# Patient Record
Sex: FEMALE | Race: WHITE | NOT HISPANIC OR LATINO | Employment: FULL TIME | URBAN - METROPOLITAN AREA
[De-identification: names, ages, dates, MRNs, and addresses within clinical notes are randomized per-mention and may not be internally consistent; named-entity substitution may affect disease eponyms.]

---

## 2018-10-03 ENCOUNTER — HISTORICAL (OUTPATIENT)
Dept: ANESTHESIOLOGY | Facility: HOSPITAL | Age: 28
End: 2018-10-03

## 2019-12-19 ENCOUNTER — OFFICE VISIT (OUTPATIENT)
Dept: INTERNAL MEDICINE | Facility: CLINIC | Age: 29
End: 2019-12-19
Payer: COMMERCIAL

## 2019-12-19 VITALS
SYSTOLIC BLOOD PRESSURE: 122 MMHG | WEIGHT: 176.13 LBS | HEIGHT: 64 IN | OXYGEN SATURATION: 99 % | HEART RATE: 109 BPM | BODY MASS INDEX: 30.07 KG/M2 | TEMPERATURE: 98 F | DIASTOLIC BLOOD PRESSURE: 84 MMHG
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
## 2019-12-19 DIAGNOSIS — R00.0 TACHYCARDIA: ICD-10-CM

## 2019-12-19 PROCEDURE — 99999 PR PBB SHADOW E&M-NEW PATIENT-LVL IV: CPT | Mod: PBBFAC,,, | Performed by: FAMILY MEDICINE

## 2019-12-19 PROCEDURE — 3008F BODY MASS INDEX DOCD: CPT | Mod: CPTII,S$GLB,, | Performed by: FAMILY MEDICINE

## 2019-12-19 PROCEDURE — 3008F PR BODY MASS INDEX (BMI) DOCUMENTED: ICD-10-PCS | Mod: CPTII,S$GLB,, | Performed by: FAMILY MEDICINE

## 2019-12-19 PROCEDURE — 99203 OFFICE O/P NEW LOW 30 MIN: CPT | Mod: S$GLB,,, | Performed by: FAMILY MEDICINE

## 2019-12-19 PROCEDURE — 99999 PR PBB SHADOW E&M-NEW PATIENT-LVL IV: ICD-10-PCS | Mod: PBBFAC,,, | Performed by: FAMILY MEDICINE

## 2019-12-19 PROCEDURE — 99203 PR OFFICE/OUTPT VISIT, NEW, LEVL III, 30-44 MIN: ICD-10-PCS | Mod: S$GLB,,, | Performed by: FAMILY MEDICINE

## 2019-12-19 RX ORDER — PHENTERMINE HYDROCHLORIDE 37.5 MG/1
TABLET ORAL
COMMUNITY
Start: 2019-12-09 | End: 2021-11-09

## 2019-12-19 RX ORDER — OMEPRAZOLE 40 MG/1
40 CAPSULE, DELAYED RELEASE ORAL 2 TIMES DAILY
COMMUNITY
Start: 2019-12-09

## 2019-12-19 RX ORDER — GABAPENTIN 300 MG/1
CAPSULE ORAL
COMMUNITY
Start: 2019-11-22 | End: 2021-11-09

## 2019-12-19 RX ORDER — NAPROXEN 500 MG/1
500 TABLET ORAL 2 TIMES DAILY WITH MEALS
Qty: 20 TABLET | Refills: 0 | Status: SHIPPED | OUTPATIENT
Start: 2019-12-19 | End: 2021-11-09

## 2019-12-19 RX ORDER — DULOXETIN HYDROCHLORIDE 20 MG/1
CAPSULE, DELAYED RELEASE ORAL
COMMUNITY
Start: 2019-11-22 | End: 2021-11-09

## 2019-12-19 RX ORDER — MONTELUKAST SODIUM 10 MG/1
10 TABLET ORAL DAILY
COMMUNITY
Start: 2019-12-09

## 2019-12-19 RX ORDER — METHOCARBAMOL 500 MG/1
TABLET, FILM COATED ORAL
COMMUNITY
Start: 2017-06-22 | End: 2021-11-09

## 2019-12-19 RX ORDER — RIZATRIPTAN BENZOATE 10 MG/1
TABLET ORAL
COMMUNITY
Start: 2017-06-22 | End: 2021-11-09

## 2019-12-19 RX ORDER — ONDANSETRON HYDROCHLORIDE 8 MG/1
8 TABLET, FILM COATED ORAL EVERY 8 HOURS PRN
COMMUNITY

## 2019-12-19 RX ORDER — NORETHINDRONE ACETATE AND ETHINYL ESTRADIOL 1MG-20(21)
KIT ORAL
COMMUNITY
Start: 2019-11-22 | End: 2021-11-08

## 2019-12-19 RX ORDER — FLUTICASONE PROPIONATE 50 MCG
2 SPRAY, SUSPENSION (ML) NASAL DAILY PRN
COMMUNITY
Start: 2019-10-29

## 2019-12-19 RX ORDER — SITAGLIPTIN AND METFORMIN HYDROCHLORIDE 1000; 50 MG/1; MG/1
TABLET, FILM COATED ORAL
COMMUNITY
Start: 2019-11-22 | End: 2022-12-07 | Stop reason: SDUPTHER

## 2019-12-19 RX ORDER — BACLOFEN 10 MG/1
10 TABLET ORAL 3 TIMES DAILY
COMMUNITY
End: 2022-11-18
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
## 2019-12-19 NOTE — ASSESSMENT & PLAN NOTE
-likely 2/2 adipex which she has been taking for a few months  -did explain that stimulants can elevated hr, bp etc  -asked her to f/u with prescribing doctor and make him aware
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
## 2021-04-15 ENCOUNTER — HISTORICAL (OUTPATIENT)
Dept: ADMINISTRATIVE | Facility: HOSPITAL | Age: 31
End: 2021-04-15

## 2021-04-15 LAB
ABS NEUT (OLG): 3.52 X10(3)/MCL (ref 2.1–9.2)
ALBUMIN SERPL-MCNC: 3.9 GM/DL (ref 3.5–5)
ALBUMIN/GLOB SERPL: 1.3 RATIO (ref 1.1–2)
ALP SERPL-CCNC: 45 UNIT/L (ref 40–150)
ALT SERPL-CCNC: 20 UNIT/L (ref 0–55)
AST SERPL-CCNC: 16 UNIT/L (ref 5–34)
BASOPHILS # BLD AUTO: 0 X10(3)/MCL (ref 0–0.2)
BASOPHILS NFR BLD AUTO: 0 %
BILIRUB SERPL-MCNC: 0.4 MG/DL
BILIRUBIN DIRECT+TOT PNL SERPL-MCNC: 0.1 MG/DL (ref 0–0.5)
BILIRUBIN DIRECT+TOT PNL SERPL-MCNC: 0.3 MG/DL (ref 0–0.8)
BUN SERPL-MCNC: 10.6 MG/DL (ref 7–18.7)
CALCIUM SERPL-MCNC: 9.3 MG/DL (ref 8.4–10.2)
CHLORIDE SERPL-SCNC: 102 MMOL/L (ref 98–107)
CO2 SERPL-SCNC: 25 MMOL/L (ref 22–29)
CREAT SERPL-MCNC: 0.76 MG/DL (ref 0.55–1.02)
EOSINOPHIL # BLD AUTO: 0.2 X10(3)/MCL (ref 0–0.9)
EOSINOPHIL NFR BLD AUTO: 3 %
ERYTHROCYTE [DISTWIDTH] IN BLOOD BY AUTOMATED COUNT: 12 % (ref 11.5–17)
GLOBULIN SER-MCNC: 3 GM/DL (ref 2.4–3.5)
GLUCOSE SERPL-MCNC: 86 MG/DL (ref 74–100)
HCT VFR BLD AUTO: 36.9 % (ref 37–47)
HGB BLD-MCNC: 12.2 GM/DL (ref 12–16)
LYMPHOCYTES # BLD AUTO: 2.4 X10(3)/MCL (ref 0.6–4.6)
LYMPHOCYTES NFR BLD AUTO: 36 %
MCH RBC QN AUTO: 30.4 PG (ref 27–31)
MCHC RBC AUTO-ENTMCNC: 33.1 GM/DL (ref 33–36)
MCV RBC AUTO: 92 FL (ref 80–94)
MONOCYTES # BLD AUTO: 0.5 X10(3)/MCL (ref 0.1–1.3)
MONOCYTES NFR BLD AUTO: 7 %
NEUTROPHILS # BLD AUTO: 3.52 X10(3)/MCL (ref 2.1–9.2)
NEUTROPHILS NFR BLD AUTO: 53 %
PLATELET # BLD AUTO: 275 X10(3)/MCL (ref 130–400)
PMV BLD AUTO: 9.1 FL (ref 9.4–12.4)
POTASSIUM SERPL-SCNC: 4.3 MMOL/L (ref 3.5–5.1)
PROT SERPL-MCNC: 6.9 GM/DL (ref 6.4–8.3)
RBC # BLD AUTO: 4.01 X10(6)/MCL (ref 4.2–5.4)
SODIUM SERPL-SCNC: 138 MMOL/L (ref 136–145)
T3FREE SERPL-MCNC: 3 PG/ML (ref 1.58–3.91)
T4 SERPL-MCNC: 7.58 UG/DL (ref 4.87–11.72)
TSH SERPL-ACNC: 2.76 UIU/ML (ref 0.35–4.94)
WBC # SPEC AUTO: 6.7 X10(3)/MCL (ref 4.5–11.5)

## 2021-06-30 ENCOUNTER — HISTORICAL (OUTPATIENT)
Dept: ADMINISTRATIVE | Facility: HOSPITAL | Age: 31
End: 2021-06-30

## 2021-07-22 ENCOUNTER — HISTORICAL (OUTPATIENT)
Dept: ADMINISTRATIVE | Facility: HOSPITAL | Age: 31
End: 2021-07-22

## 2021-07-23 ENCOUNTER — HISTORICAL (OUTPATIENT)
Dept: ANESTHESIOLOGY | Facility: HOSPITAL | Age: 31
End: 2021-07-23

## 2021-07-25 LAB — FINAL CULTURE: NORMAL

## 2022-12-07 ENCOUNTER — OFFICE VISIT (OUTPATIENT)
Dept: PRIMARY CARE CLINIC | Facility: CLINIC | Age: 32
End: 2022-12-07
Payer: COMMERCIAL

## 2022-12-07 VITALS
BODY MASS INDEX: 29.9 KG/M2 | SYSTOLIC BLOOD PRESSURE: 120 MMHG | HEART RATE: 108 BPM | WEIGHT: 174.19 LBS | DIASTOLIC BLOOD PRESSURE: 80 MMHG | RESPIRATION RATE: 18 BRPM | OXYGEN SATURATION: 100 %

## 2022-12-07 DIAGNOSIS — E28.2 PCOS (POLYCYSTIC OVARIAN SYNDROME): ICD-10-CM

## 2022-12-07 DIAGNOSIS — E88.819 INSULIN RESISTANCE: ICD-10-CM

## 2022-12-07 DIAGNOSIS — R63.5 WEIGHT GAIN: ICD-10-CM

## 2022-12-07 DIAGNOSIS — E66.9 CLASS 1 OBESITY WITH SERIOUS COMORBIDITY AND BODY MASS INDEX (BMI) OF 30.0 TO 30.9 IN ADULT, UNSPECIFIED OBESITY TYPE: ICD-10-CM

## 2022-12-07 DIAGNOSIS — M79.7 FIBROMYALGIA: ICD-10-CM

## 2022-12-07 DIAGNOSIS — E66.9 CLASS 1 OBESITY WITH BODY MASS INDEX (BMI) OF 30.0 TO 30.9 IN ADULT, UNSPECIFIED OBESITY TYPE, UNSPECIFIED WHETHER SERIOUS COMORBIDITY PRESENT: ICD-10-CM

## 2022-12-07 DIAGNOSIS — K21.9 GASTROESOPHAGEAL REFLUX DISEASE WITHOUT ESOPHAGITIS: ICD-10-CM

## 2022-12-07 DIAGNOSIS — E55.9 VITAMIN D DEFICIENCY: ICD-10-CM

## 2022-12-07 PROBLEM — G44.89 HEADACHE SYNDROME: Status: ACTIVE | Noted: 2021-01-07

## 2022-12-07 PROBLEM — E78.5 HYPERLIPIDEMIA: Status: ACTIVE | Noted: 2022-12-07

## 2022-12-07 PROCEDURE — 3074F PR MOST RECENT SYSTOLIC BLOOD PRESSURE < 130 MM HG: ICD-10-PCS | Mod: CPTII,S$GLB,, | Performed by: FAMILY MEDICINE

## 2022-12-07 PROCEDURE — 3008F PR BODY MASS INDEX (BMI) DOCUMENTED: ICD-10-PCS | Mod: CPTII,S$GLB,, | Performed by: FAMILY MEDICINE

## 2022-12-07 PROCEDURE — 3079F DIAST BP 80-89 MM HG: CPT | Mod: CPTII,S$GLB,, | Performed by: FAMILY MEDICINE

## 2022-12-07 PROCEDURE — 1160F PR REVIEW ALL MEDS BY PRESCRIBER/CLIN PHARMACIST DOCUMENTED: ICD-10-PCS | Mod: CPTII,S$GLB,, | Performed by: FAMILY MEDICINE

## 2022-12-07 PROCEDURE — 1160F RVW MEDS BY RX/DR IN RCRD: CPT | Mod: CPTII,S$GLB,, | Performed by: FAMILY MEDICINE

## 2022-12-07 PROCEDURE — 3074F SYST BP LT 130 MM HG: CPT | Mod: CPTII,S$GLB,, | Performed by: FAMILY MEDICINE

## 2022-12-07 PROCEDURE — 99214 PR OFFICE/OUTPT VISIT, EST, LEVL IV, 30-39 MIN: ICD-10-PCS | Mod: S$GLB,,, | Performed by: FAMILY MEDICINE

## 2022-12-07 PROCEDURE — 1159F MED LIST DOCD IN RCRD: CPT | Mod: CPTII,S$GLB,, | Performed by: FAMILY MEDICINE

## 2022-12-07 PROCEDURE — 1159F PR MEDICATION LIST DOCUMENTED IN MEDICAL RECORD: ICD-10-PCS | Mod: CPTII,S$GLB,, | Performed by: FAMILY MEDICINE

## 2022-12-07 PROCEDURE — 3008F BODY MASS INDEX DOCD: CPT | Mod: CPTII,S$GLB,, | Performed by: FAMILY MEDICINE

## 2022-12-07 PROCEDURE — 99999 PR PBB SHADOW E&M-EST. PATIENT-LVL IV: CPT | Mod: PBBFAC,,, | Performed by: FAMILY MEDICINE

## 2022-12-07 PROCEDURE — 99214 OFFICE O/P EST MOD 30 MIN: CPT | Mod: S$GLB,,, | Performed by: FAMILY MEDICINE

## 2022-12-07 PROCEDURE — 3079F PR MOST RECENT DIASTOLIC BLOOD PRESSURE 80-89 MM HG: ICD-10-PCS | Mod: CPTII,S$GLB,, | Performed by: FAMILY MEDICINE

## 2022-12-07 PROCEDURE — 99999 PR PBB SHADOW E&M-EST. PATIENT-LVL IV: ICD-10-PCS | Mod: PBBFAC,,, | Performed by: FAMILY MEDICINE

## 2022-12-07 RX ORDER — SITAGLIPTIN AND METFORMIN HYDROCHLORIDE 1000; 50 MG/1; MG/1
1 TABLET, FILM COATED ORAL 2 TIMES DAILY WITH MEALS
Qty: 60 TABLET | Refills: 1 | Status: SHIPPED | OUTPATIENT
Start: 2022-12-07 | End: 2023-01-30

## 2022-12-07 NOTE — PROGRESS NOTES
"Subjective:       Patient ID: Rosemarie Good is a 32 y.o. female.  Pmhx, fam hx, soc hx, surg hx, allergies, med list reviewed  Referring MD: Yinka  PCP: none yet  BMI noted 29.9  Diet: variable  Exercise/Activity: increased at work  Sleep: fair  Stressors: work  Anxiety/Depression Screen/PHQ-2: neg  Works for [employer]; 24/36 hr shifts. Has headache  Pt did eat breakfast  Usually does not snack at home; just at work  Works 80 hours/week    Chief Complaint: weight gain,   Pt referred by Dr Honeycutt. Not on ocp; trying to conceive. NO supplements currently. NO insulin resistance testing recently but past pos hx.     Pt was seeing Dr. Schwartz and was on janumet and ozempic--ozempic was helpful but would like to continue janumet. Pt states can no longer see Dr Schwartz.       She has hx of PCOS and insulin resistance.     Pt has noticed sugar cravings in afternoon and evenings.     Pt did try adipex and made her feel bad; made resting heart rate increase.     Hx of migraines.     She eats "whatever I can" for lunch. Tends to be hospital food or drive thru.   Pt is moderately picky eater. Does not love veggies.   Likes corn, beans.    Does not like squash, zucchini, cauliflower.    Eats chips and other processed foods on truck. Drinks propel. Pt has zero sugar version. Also likes powerade zero. Likes sweet tea and Dr Pepper but has transitioned to zero sugar version.   Likes [employer] sweet tea--has not tried half and half. She will feel well throughout the day but then crashes.   No veggies raw. Does not like nuts  Does not like hummus or ranch dip.  Usually does $3 bundle: cheeseburger and small willis  Gets some choices at cafeteria at [employer]: beef roast/carrots/potatoes  Does not eat peanut butter   cooks at home  Likes string cheese  No tuna or boiled eggs  Does not like boiled eggs or tuna  Does not like avocados or guacmole        HPI  Review of Systems   Constitutional:  Negative for activity change, " "appetite change, fatigue and fever.   HENT:  Negative for mouth dryness and goiter.    Eyes:  Negative for visual disturbance.   Respiratory:  Negative for apnea, cough, chest tightness and shortness of breath.    Cardiovascular:  Negative for chest pain, palpitations and leg swelling.   Gastrointestinal:  Negative for abdominal pain, constipation and diarrhea.   Endocrine: Negative for cold intolerance, heat intolerance, polydipsia, polyphagia and polyuria.   Genitourinary:  Negative for frequency and menstrual problem.   Musculoskeletal:  Negative for arthralgias and myalgias.   Integumentary:  Negative for color change and rash.   Psychiatric/Behavioral:  Negative for sleep disturbance. The patient is not nervous/anxious.        Objective:    Waist circ: > 40"  Physical Exam  Vitals and nursing note reviewed.   Constitutional:       General: She is not in acute distress.  HENT:      Head: Normocephalic and atraumatic.      Mouth/Throat:      Pharynx: Oropharynx is clear.   Eyes:      General: No scleral icterus.     Pupils: Pupils are equal, round, and reactive to light.   Neck:      Comments: No TM  Cardiovascular:      Rate and Rhythm: Normal rate and regular rhythm.      Pulses: Normal pulses.      Heart sounds: Normal heart sounds. No murmur heard.    No friction rub. No gallop.   Pulmonary:      Effort: Pulmonary effort is normal. No respiratory distress.      Breath sounds: Normal breath sounds. No wheezing, rhonchi or rales.   Abdominal:      General: Bowel sounds are normal. There is no distension.      Palpations: Abdomen is soft.      Tenderness: There is no abdominal tenderness.   Musculoskeletal:         General: No swelling.      Cervical back: Normal range of motion and neck supple. No tenderness.   Lymphadenopathy:      Cervical: No cervical adenopathy.   Skin:     General: Skin is warm.      Capillary Refill: Capillary refill takes less than 2 seconds.      Findings: No erythema or rash. "   Neurological:      Mental Status: She is alert and oriented to person, place, and time.   Psychiatric:         Mood and Affect: Mood normal.         Behavior: Behavior normal.       Assessment:         ICD-10-CM ICD-9-CM   1. PCOS (polycystic ovarian syndrome)  E28.2 256.4   2. Weight gain  R63.5 783.1   3. Gastroesophageal reflux disease without esophagitis  K21.9 530.81   4. Class 1 obesity with serious comorbidity and body mass index (BMI) of 30.0 to 30.9 in adult, unspecified obesity type  E66.9 278.00    Z68.30 V85.30   5. Fibromyalgia  M79.7 729.1   6. Vitamin D deficiency  E55.9 268.9   7. Class 1 obesity with body mass index (BMI) of 30.0 to 30.9 in adult, unspecified obesity type, unspecified whether serious comorbidity present  E66.9 278.00    Z68.30 V85.30   8. Insulin resistance  E88.81 277.7            Plan:       PCOS (polycystic ovarian syndrome)  -     SITagliptan-metformin (JANUMET) 50-1,000 mg per tablet; Take 1 tablet by mouth 2 (two) times daily with meals.  Dispense: 60 tablet; Refill: 1    Weight gain  Work on insulin stabilization  Try one new veggie: dw pt stabilization    Gastroesophageal reflux disease without esophagitis  Chronic/stable    Class 1 obesity with serious comorbidity and body mass index (BMI) of 30.0 to 30.9 in adult, unspecified obesity type    Fibromyalgia  Chronic/stable    Vitamin D deficiency  Continue vit d supplement  Pt enjoys mushrooms    Class 1 obesity with body mass index (BMI) of 30.0 to 30.9 in adult, unspecified obesity type, unspecified whether serious comorbidity present  -     Ambulatory referral/consult to Internal Medicine    Insulin resistance  -     Comprehensive Metabolic Panel; Future; Expected date: 12/07/2022  -     Hemoglobin A1C; Future; Expected date: 12/07/2022  -     Insulin, Random; Future; Expected date: 12/07/2022  -     Lipid Panel; Future; Expected date: 12/07/2022  -     Ambulatory referral/consult to Internal Medicine  -      SITagliptan-metformin (JANUMET) 50-1,000 mg per tablet; Take 1 tablet by mouth 2 (two) times daily with meals.  Dispense: 60 tablet; Refill: 1      Labs next time    Refill Janumet   Consider lomaira if no help    Work on reduction of sugar sweetened beverages  1/2 and 1/2    [place] and string cheese, edamame, greek yogurt to make ranch dip  Eat fit brice and shopping list   Snack 1-2 days/week

## 2022-12-07 NOTE — PATIENT INSTRUCTIONS
"Goal: < 25 grams added sugar/day    Natural Vitality Calm -- magnesium supplement that may help    Pregnitude--supplement (prenatal): has folic acid and joselin-inositiol          PRODUCE  [] All fresh fruit   [] All fresh vegetables   [] All fresh herbs  [] All herb purees + pastes  [] Pre-spiralized vegetable noodles   [] Steam-In-The-Bag begetables  [] Riced cauliflower  [] Jicama sticks  [] Love Beets  all varieties  [] Wholly Guacamole  all varieties  [] Hummus  all varieties, chickpea + vegetable  [] Tofu Shirataki noodles    [] Tofu  all varieties  [] Tempeh  all varieties    PROTEIN  CHICKEN   [] Boneless, skinless breasts  [] Boneless, skinless thighs  [] Ground chicken breast, at least 93% lean  [] Chicken breast cutlet  [] Aidell's  Chicken Sausage + Chicken Meatballs    TURKEY   [] Turkey breast tenderloin   [] Ground turkey breast, at least 93% lean  [] Mikala Naturals  Turkey Sausage    BEEF  [] Tenderloin  [] Sirloin  [] Top Loin  [] Flank Steak  [] Round Steak  [] Filet  [] Lean ground beef, at least 93% lean + grass-fed preferable    PORK  [] Tenderloin  [] Pork Chop  [] Center Cut  [] Mikala Naturals  No-Sugar W[place]    BISON  [] [place]  90 - 95% lean    SEAFOOD  [] All fresh fish + seafood; locally sourced when possible  [] Smoked salmon    HEAT + EAT ENTREES   [] Thomas's Natural Foods  Chicken, Pork, Beef  [] [place]  "All Natural" Grilled Chicken Breast + Strips, all varieties    SAUCES SPREADS + DIPS  [] Bitchin Sauce  Original, Chipotle, Cilantro [employer]  [] Annia's Kitchen  Tzatziki Yogurt Dip, Babaganoush, Hummus  [] Wholly Guacamole  all varieties  [] Hummus  all varieties  [] [place] Gringo Salsa  all varieties  [] Mrs. Sakina's Salsa  all varieties  [] Stubb's All Natural BBQ Sauce  [] Primal Kitchen  Minor, Ketchup, BBQ Sauce  [] Primal Kitchen Pasta Sauce  Roasted Garlic, Tomato Basil, no-dairy Vodka Sauce  [] Sal & Dalia's  HeartSmart Pasta Sauce    DAIRY/DAIRY " SUBSTITUTES/EGGS  EGGS   [] All eggs  cage-free, pasture-raise preferable  [] Crepini  egg wraps  [] Vital Farms  Pasture-Raised Egg Bites  [] JUST Egg [vegan]     CREAMERS   [] Califia  Better Half, original + vanilla unsweetened  [] NutPods  all varieties    MILK   [] Horizon Organic  all varieties except chocolate  [] Organic Valley  all varieties except chocolate  [] Organic Valley  ultra-filtered, reduced fat milk     PLANT_BASED MILK ALTERNATIVES  [] All unsweetened almond milks  original, vanilla + chocolate  [] Ripple  unsweetened   [] Milkadamia  original +_ vanilla, unsweetened   [] Forager  original + vanilla, unsweetened   [] Silk Organic  soy milk, unsweetened  [] Oatly  unsweetened  [] Califia  regular + protein-fortified oat milk, unsweetened     CHEESES  [] Regular or reduced fat cheeses  [] BelGioso  Fresh Mozzarella Snack Packs, Parmesan Power-full Snack   [] Goat cheese  [] Fresh mozzarella  [] String cheese  all varieties  [] Barbie Cottage Cheese  [] Carli's Cultured Cottage Cheese  [] Nell Life 'Just Like Mozzarella'  plant-based shreds and other varieties  [] Parmela Creamery  plant-based shredded cheese    YOGURT  [] Fage  2% low-fat, plain  [] Siggi's  plain, vanilla  [] Chobani Greek  nonfat + whole milk yogurt, plain   [] Chobani Less Sugar  all flavored varieties   [] Oikos Greek  nonfat, plain  [] Two Good  all varieties   [] Yemeni Provisions  plain  [] [place] Organic  low-fat + nonfat, plain  [] [facility] Farm  goat milk yogurt, plain  [] Kefit  unsweetened, plain  [] Forager  Greek style unsweetened, plain [dairy-free]  [] [place]  unsweetened Greek style, plain [dairy-free]  [] [facility]  almond milk yogurt, vanilla or plain, unsweetened [dairy-free]    FREEZER SECTION  FROZEN VEGGIES  [] All plain frozen veggies + greens [e.g. broccoli, brussels, carrots, okra, mushrooms, zucchini, yellow squash, butternut squash, kale, spinach, gail  greens]  [] Riced veggies [e.g. cauliflower, broccoli, butternut squash]  [] Edamame  all varieties  [] Green Giant  [] Veggie Spirals  [] Marinated Veggies [e.g. eggplant, peppers, zucchini]  [] Simply Steam [place] Sprouts  [] Birds Eye  [] Power Blend Italian Style  lentils, broccoli, kale, zucchini  []  [place] Sprouts & Carrots  [] Oven Roasters Broccoli & Cauliflower  [] [place] Blend  [] Tattooed   [] Green Bean Blend  [] Farmer's Market Ratatouille  [] Butter Balsamic Glazed Vegetables  [] Riced Cauliflower & Quinoa Mediterranean Style  [] Michelle's Good Life  Southern Style Greens [sauteed kale + onion]    FROZEN FRUITS  [] All unsweetened frozen fruits  all varieties  [] Dole Fruits & Veggie Blends  Berries 'n Kale  [] Dole Mix-ins  Triple Berry     FROZEN ENTREES  [] The Good Kitchen meals  all varieties [ e.g. Chili Lime Chicken Over Riced Cauliflower]  [] Premium Paleo  Not Braydon Momma's Meatloaf  [] Primal Kitchen  Chicken Pesto + Steak Fajitas w/ Peppers & Onions  [] Eating Well Frozen Entrees  Butter Chicken Masala, Steak Carne Asada, Creamy Pesto Chicken, Chicken + Wild Rice Stroganoff, Yellow [place]into Chicken, Sun-dried Tomato Chicken, Chicken Lo Mein  [] Realgood Entree Bowls  Taiwanese Inspired Beef Bowl over Riced Cauliflower, Chicken Burrito Bowl   [] Great Karma Coconut [place]  [] Radha's  Tamale José Manuel with Black Beans, Vegetable Lasagna  [] Kashi Mayan [place] Bake  [] Healthy Choice  Simply Steamers Chicken Fried Rice  [] Basil Pesto Chicken & Central African Style Pork Power Bowls  [] Tattooed   Enchilada Bowl  [] Buzz Farms  Spicy Black Bean Burgers    FROZEN PIZZAS  [] Cauli'flour Foods  Cauliflower Pizza Crusts  [] Outer Aisle  Cauliflower Crust  [] Radha's  Veggie Crust Cheese Pizza  [] Quest Pizza     VEGETARIAN PRODUCTS  [] Beyond Meat  ground 'meat' + grilled 'chicken' strips  [] Tofurkey  Original Italian Sausage + Original Tempeh  [] Gardein  Beefless  Ground + Meatless Meatballs  [] Buzz Farms Grillers  Original Burger, Crumbles, Meatballs    ICE CREAMS + FROZEN DESSERTS  [] Halo Top  regular + keto series, pops  [] Rebel  ice cream + dessert sandwiches  [] Enlightened  ice cream + bars  [] Nightfood  ice cream  [] Realgood  ice cream  [] Arctic Zero Fit  frozen pint  [] The Frozen Farmer  sorbets  [] Wholly Rollies  Protein Balls, all varieties  [] Dream Pops  Coconut Latte    FROZEN BREAKFASTS  [] Realgood  Breakfast Sandwiches on Cauliflower Cheesy Bread  [] Rebel  ice cream + dessert sandwiches  [] Enlightened  ice cream + bars  [] Nightfood  ice cream  [] Realgood  ice cream  [] Arctic Zero Fit  frozen pint  [] The Frozen Farmer  sorbets  [] Wholly Rollies  Protein Balls, all varieties  [] Dream Pops  Coconut Latte    BREADS/BUNS/WRAPS  [] Luis Bread: All Types - In Freezer Section   [] Flat Out Light Wraps - All Varieties   [] Flat Out Protein Up Carb Down Flat Bread   [] Kontos Whole Wheat Pocket Divya   [] Darnell LION 100% Whole Wheat Tortillas   [] LaTortilla Factory Tortillas - Smart & Delicious; 50 or 80-calorie   [] Nature's Own 100% Whole Wheat Bread   [] Orowheat Healthful - 100% Whole Wheat Slice Bread and [place] Thins   [] Orowheat Healthful - Whole Wheat Nuts & Grain Bread; Flax & Seed Bread   [] [place] Farm Natural Whole Grain 15 Bread   [] [place] Natural Whole Grain English Muffin - 100% Whole Wheat   [] [place] Very Thin 100% Whole Wheat   [] Marianne Donato 45 Calories and Delightful   [] Jed' 100% Whole Wheat Thin-Sliced Bagels and English Muffins   [] Western Bagel: Perfect 10     GLUTEN FREE  [] Kai's Gluten Free Bread   [] [place] Bakehouse 7-Grain Gluten Free Bread     LEGUME PASTA   [] Explore Asian Organic Black Bean Spaghetti   [] Modern Table   [] Tolerant Foods       NUT BUTTERS & JELLIES    NUT BUTTERS   [] Better'n Chocolate: Coconut Chocolate Peanut Butter Spread   [] Better'n Peanut  Butter - All Types   [] Earth Balance Coconut and Peanut Spread   [] Frank's Nut [place]   [] MaraNatha: All Natural Roasted Cashew Butter - [place] or Creamy   [] MaraNatha: Roasted Peanut Butter   [] Nuts 'N More Peanut [place] - All Flavors   [] PB2 Powder - Original or Chocolate   [] Skippy Natural - Creamy, Super Chunk   [] Smart Balance Peanut Butter - [place] or Creamy   [] Peanut Butter & Company:   [] Smooth , Crunch Time, The Heat Is On, Old Fashioned Smooth, Mighty Nut- Powdered Peanut Butter, Squeeze Pack   [] Smucker's Natural Peanut Butter - [place] or Creamy   [] Sunbutter Nut Butter   [] Wild Friends Protein Peanut Butter/[place] o Butter - Vanilla or Chocolate     JELLIES  o Polaner's All Fruit   o Clearly Organic Best Choice: Strawberry Fruit Spread       SNACKS    BARS  [] Kashi Bars - Chewy or Crunchy; Honey [place] o Flax or Peanut Butter   [] KIND Bars - 5 Grams of Sugar or Less   [] KIND Protein Bars - Strong and KIND   [] Nature Valley Protein Bar - All Varieties   [] Nature Valley Roasted Nut Crunch - [place] Crunch; Peanut Crunch   [] [facility] Simple Nut Bar - Roasted Peanut & Honey   [] [facility] Valley Simple Nut Bar - [place], Cashew & Sea Salt   [] [facility] Nut Milam Bar - Salted Caramel Peanut   [] Think Thin Protein Bars   [] Quest Bars, Power Crunch Bars, Pure Protein Bars     BEEF JERKY - NITRATE FREE   [] Game On   [] Grass Run Farms   [] Krave   [] Ostrim   [] Perky Jerky   [] Primal Strips Meatless Vegan Jerky   [] [place]     CHIPS   [] Beanitos Chips   [] Fruit Crisps - e.g. Brother's-All-Natural, Bare Fruit, Yoga Chips   [] Michaela's Soy Crisps: 1.3 ounce bag   [] Quest Protein Chips   [] Wasa Whole Wheat Crisp Bread     CRACKERS  [] [place]'s Gone Crackers   [] Nabisco Triscuit: Regular and Thin Crisp Crackers   [] Vans Say Cheese Crackers (G-F)     POPCORN/NUTS   [] Pawel Palmer's Smart Pop Popcorn - Single Serving   [] 100-Calorie Pack of Nuts - All Varieties      PROTEIN POWDERS & DRINKS  []  Protein -  Whey Protein Powder   [] Garden of Life Raw Protein Powder   [] Iconic Ready-To-Drink Protein Drink   [] Sen One Protein Powder   [] VegaSport Protein Powder     SALSA/HUMMUS/DIPS   [] Eat Well Embrace Life: Zesty Sriracha Carrot o Hummus   [] Pre-Portioned Guacamole Packs   [] Jesus's   [] Tostitos Restaurant Style Salsa       SOUPS   [] Radha's Organic Soups - Lentil, Vegetable, Split Pea, Low-Sodium     CANNED GOODS   [] 100% Pure Pumpkin   [] BlueRunner Creole Cream-Style Red Beans or Navy Beans   [] Cajun Power Chicken Gumbo Base   [] Chicken of the Sea [place]   [] Rigoberto Fresh Cut Sliced Beets   [] Hormel Breast of Chicken in Water   [] LeSuer Tender Baby Whole Carrots   [] McIljim Tabasco Butner Starter   [] Melbaist: Chunk Lite Tuna in Water, Gourmet Select Pouches   [] StarKist: Yellowfin Tuna Fillets   [] Trappey's: Kidney, Butter, [place], Black Eye, Field, and Black Beans   [] MARQUISE Davis's Turnip Greens or Jose Spinach     CONDIMENTS/ SAUCES/SPREADS/ SPICES  [] Sidney Galeano's Magic Seasonings - Regular or Salt Free   [] Chris Oleary's Sauces - All Flavors   [] Laughing Cow Light - All Flavors   [] Dash Salt-Free Marinade - All Flavors   [] Gabriel & Dalia's: Heart Smart Pasta Sauces   [] Tabasco     SALAD DRESSINGS  [] Mellissa's Naturals: Lite Honey Mustard   [] Garnt's Own: Lighten Up Salad Dressing - All Varieties   [] OPA Greek Yogurt Dressings - Ranch, Blue o Cheese, Caesar, Feta Dill     SWEETENERS  [] Sweet Peletier Sweetener   [] Swerve   [] Truvia     BEVERAGES  [] Coconut Water   [] Crystal Light PURE - All Flavors   [] Honest Tea: Just Green Tea, Unsweetened   [] Kombucha Tea   [] [place]   [] [place] Sisters Bloody [place] Mix   [] Metromint - Zero-Sugar; All Natural Flavored   [] Vinod - Plain or Flavored   [] Debi Perez   [] Steaz - Zero-Sugar, All-Natural, Sparkling Tea   [] Tea Bags: Any Brand - e.g. Den, Yogi, Tazzo,  Celestial   [] V8 100% Vegetable Juice   [] Vitamin Water Zero   [] Water   [] Zevia - Stevia Sweetened Soft Drink     BEER/SELENA/LIQUORS  []Denise's Premier Light 64 Calories   [] Bud Select - 55 Calories   [] [facility] Bloody [place] Mix   [] Longo Genuine Draft - 64 Calories   [] Red or White Wine - All Varieties     CEREALS: HOT/COLD   [] Denise'kailey Saul's Original Cereal  [] Baldev's Mill Oat Bran Hot Cereal - Cracked Wheat, Multi-Grain  [] Kashi GoLean Cereal  [] Kashi GoLean Hot Cereal packets - Vanilla; Honey Cinnamon  [] Oswald's Special K Protein Cereal  [] Gwen's Steel Cut Uzbek Oatmeal  [] Nature's Path Smart Bran  [] Restorationist Instant Oatmeal packet, Original  [] Restorationist Old Fashioned Restorationist Oats  [] Uncle Chris's Whole Wheat & Flaxseed Original Cereal

## 2023-03-03 ENCOUNTER — PATIENT MESSAGE (OUTPATIENT)
Dept: PRIMARY CARE CLINIC | Facility: CLINIC | Age: 33
End: 2023-03-03

## 2023-05-18 DIAGNOSIS — E28.2 PCOS (POLYCYSTIC OVARIAN SYNDROME): ICD-10-CM

## 2023-05-18 DIAGNOSIS — E88.819 INSULIN RESISTANCE: ICD-10-CM

## 2023-05-18 RX ORDER — SITAGLIPTIN AND METFORMIN HYDROCHLORIDE 1000; 50 MG/1; MG/1
TABLET, FILM COATED ORAL
Qty: 60 TABLET | Refills: 0 | Status: SHIPPED | OUTPATIENT
Start: 2023-05-18 | End: 2023-06-19

## 2023-07-15 DIAGNOSIS — E88.819 INSULIN RESISTANCE: ICD-10-CM

## 2023-07-15 DIAGNOSIS — E28.2 PCOS (POLYCYSTIC OVARIAN SYNDROME): ICD-10-CM

## 2023-07-16 NOTE — TELEPHONE ENCOUNTER
Refill Routing Note   Medication(s) are not appropriate for processing by [facility] for the following reason(s):      Responsible provider unclear    [facility] action(s):  Defer Care Due:  None identified            Appointments  past 12m or future 3m with PCP    Date Provider   Last Visit   12/7/2022 Aundrea Biswas MD   Next Visit   Visit date not found Aundrea Biswas MD   ED visits in past 90 days: 0        Note composed:1:33 PM 07/16/2023

## 2023-07-17 ENCOUNTER — PATIENT MESSAGE (OUTPATIENT)
Dept: PRIMARY CARE CLINIC | Facility: CLINIC | Age: 33
End: 2023-07-17
Payer: COMMERCIAL

## 2023-07-17 DIAGNOSIS — E66.9 CLASS 1 OBESITY WITH SERIOUS COMORBIDITY AND BODY MASS INDEX (BMI) OF 30.0 TO 30.9 IN ADULT, UNSPECIFIED OBESITY TYPE: ICD-10-CM

## 2023-07-17 DIAGNOSIS — E28.2 PCOS (POLYCYSTIC OVARIAN SYNDROME): Primary | ICD-10-CM

## 2023-07-17 RX ORDER — SITAGLIPTIN AND METFORMIN HYDROCHLORIDE 1000; 50 MG/1; MG/1
TABLET, FILM COATED ORAL
Qty: 60 TABLET | Refills: 0 | Status: SHIPPED | OUTPATIENT
Start: 2023-07-17 | End: 2023-09-20

## 2023-07-24 ENCOUNTER — PATIENT MESSAGE (OUTPATIENT)
Dept: PRIMARY CARE CLINIC | Facility: CLINIC | Age: 33
End: 2023-07-24
Payer: COMMERCIAL

## 2023-07-24 RX ORDER — METFORMIN HYDROCHLORIDE 500 MG/1
1000 TABLET, EXTENDED RELEASE ORAL 2 TIMES DAILY WITH MEALS
Qty: 60 TABLET | Refills: 2 | Status: SHIPPED | OUTPATIENT
Start: 2023-07-24 | End: 2023-09-20

## 2023-08-11 ENCOUNTER — PATIENT MESSAGE (OUTPATIENT)
Dept: PRIMARY CARE CLINIC | Facility: CLINIC | Age: 33
End: 2023-08-11
Payer: COMMERCIAL

## 2023-08-23 ENCOUNTER — PATIENT MESSAGE (OUTPATIENT)
Dept: PRIMARY CARE CLINIC | Facility: CLINIC | Age: 33
End: 2023-08-23
Payer: COMMERCIAL

## 2023-09-07 LAB
ABO + RH BLD: NORMAL
GLUCOSE, 1 HOUR: 166 MG/DL
HBV SURFACE AG SERPL QL IA: NEGATIVE
HCV AB SERPL QL IA: NEGATIVE
HGB BLD-MCNC: 10.9 G/DL
HIV 1+2 AB+HIV1 P24 AG SERPL QL IA: NEGATIVE

## 2023-09-12 DIAGNOSIS — Z36.9 ENCOUNTER FOR FETAL ULTRASOUND: Primary | ICD-10-CM

## 2023-09-20 ENCOUNTER — PROCEDURE VISIT (OUTPATIENT)
Dept: MATERNAL FETAL MEDICINE | Facility: CLINIC | Age: 33
End: 2023-09-20
Payer: COMMERCIAL

## 2023-09-20 ENCOUNTER — OFFICE VISIT (OUTPATIENT)
Dept: MATERNAL FETAL MEDICINE | Facility: CLINIC | Age: 33
End: 2023-09-20
Payer: COMMERCIAL

## 2023-09-20 VITALS
SYSTOLIC BLOOD PRESSURE: 136 MMHG | RESPIRATION RATE: 18 BRPM | OXYGEN SATURATION: 98 % | HEART RATE: 105 BPM | WEIGHT: 181.31 LBS | BODY MASS INDEX: 30.95 KG/M2 | HEIGHT: 64 IN | DIASTOLIC BLOOD PRESSURE: 81 MMHG

## 2023-09-20 DIAGNOSIS — Z36.9 ENCOUNTER FOR FETAL ULTRASOUND: ICD-10-CM

## 2023-09-20 DIAGNOSIS — E04.1 THYROID NODULE: ICD-10-CM

## 2023-09-20 DIAGNOSIS — M79.7 FIBROMYALGIA: ICD-10-CM

## 2023-09-20 DIAGNOSIS — O24.912 DIABETES MELLITUS AFFECTING PREGNANCY IN SECOND TRIMESTER: ICD-10-CM

## 2023-09-20 DIAGNOSIS — R00.2 PALPITATIONS: ICD-10-CM

## 2023-09-20 PROCEDURE — 99204 OFFICE O/P NEW MOD 45 MIN: CPT | Mod: 25,S$GLB,, | Performed by: OBSTETRICS & GYNECOLOGY

## 2023-09-20 PROCEDURE — 3079F PR MOST RECENT DIASTOLIC BLOOD PRESSURE 80-89 MM HG: ICD-10-PCS | Mod: CPTII,S$GLB,, | Performed by: OBSTETRICS & GYNECOLOGY

## 2023-09-20 PROCEDURE — 76805 PR US, OB 14+WKS, TRANSABD, SINGLE GESTATION: ICD-10-PCS | Mod: S$GLB,,, | Performed by: OBSTETRICS & GYNECOLOGY

## 2023-09-20 PROCEDURE — 1160F PR REVIEW ALL MEDS BY PRESCRIBER/CLIN PHARMACIST DOCUMENTED: ICD-10-PCS | Mod: CPTII,S$GLB,, | Performed by: OBSTETRICS & GYNECOLOGY

## 2023-09-20 PROCEDURE — 76805 OB US >/= 14 WKS SNGL FETUS: CPT | Mod: S$GLB,,, | Performed by: OBSTETRICS & GYNECOLOGY

## 2023-09-20 PROCEDURE — 3079F DIAST BP 80-89 MM HG: CPT | Mod: CPTII,S$GLB,, | Performed by: OBSTETRICS & GYNECOLOGY

## 2023-09-20 PROCEDURE — 3008F PR BODY MASS INDEX (BMI) DOCUMENTED: ICD-10-PCS | Mod: CPTII,S$GLB,, | Performed by: OBSTETRICS & GYNECOLOGY

## 2023-09-20 PROCEDURE — 99204 PR OFFICE/OUTPT VISIT, NEW, LEVL IV, 45-59 MIN: ICD-10-PCS | Mod: 25,S$GLB,, | Performed by: OBSTETRICS & GYNECOLOGY

## 2023-09-20 PROCEDURE — 1159F MED LIST DOCD IN RCRD: CPT | Mod: CPTII,S$GLB,, | Performed by: OBSTETRICS & GYNECOLOGY

## 2023-09-20 PROCEDURE — 3075F PR MOST RECENT SYSTOLIC BLOOD PRESS GE 130-139MM HG: ICD-10-PCS | Mod: CPTII,S$GLB,, | Performed by: OBSTETRICS & GYNECOLOGY

## 2023-09-20 PROCEDURE — 3044F PR MOST RECENT HEMOGLOBIN A1C LEVEL <7.0%: ICD-10-PCS | Mod: CPTII,S$GLB,, | Performed by: OBSTETRICS & GYNECOLOGY

## 2023-09-20 PROCEDURE — 3075F SYST BP GE 130 - 139MM HG: CPT | Mod: CPTII,S$GLB,, | Performed by: OBSTETRICS & GYNECOLOGY

## 2023-09-20 PROCEDURE — 3008F BODY MASS INDEX DOCD: CPT | Mod: CPTII,S$GLB,, | Performed by: OBSTETRICS & GYNECOLOGY

## 2023-09-20 PROCEDURE — 1160F RVW MEDS BY RX/DR IN RCRD: CPT | Mod: CPTII,S$GLB,, | Performed by: OBSTETRICS & GYNECOLOGY

## 2023-09-20 PROCEDURE — 1159F PR MEDICATION LIST DOCUMENTED IN MEDICAL RECORD: ICD-10-PCS | Mod: CPTII,S$GLB,, | Performed by: OBSTETRICS & GYNECOLOGY

## 2023-09-20 PROCEDURE — 3044F HG A1C LEVEL LT 7.0%: CPT | Mod: CPTII,S$GLB,, | Performed by: OBSTETRICS & GYNECOLOGY

## 2023-09-20 RX ORDER — INSULIN DETEMIR 100 [IU]/ML
10 INJECTION, SOLUTION SUBCUTANEOUS NIGHTLY
Qty: 3 ML | Refills: 3 | Status: SHIPPED | OUTPATIENT
Start: 2023-09-20 | End: 2023-11-28 | Stop reason: SDUPTHER

## 2023-09-20 RX ORDER — METFORMIN HYDROCHLORIDE 1000 MG/1
1000 TABLET ORAL 2 TIMES DAILY WITH MEALS
COMMUNITY
End: 2023-11-28 | Stop reason: SDUPTHER

## 2023-09-20 RX ORDER — ASPIRIN 81 MG/1
81 TABLET ORAL DAILY
Qty: 60 TABLET | Refills: 3 | Status: ON HOLD | OUTPATIENT
Start: 2023-09-20 | End: 2024-02-05 | Stop reason: HOSPADM

## 2023-09-20 RX ORDER — PEN NEEDLE, DIABETIC 30 GX3/16"
1 NEEDLE, DISPOSABLE MISCELLANEOUS NIGHTLY
Qty: 30 EACH | Refills: 1 | Status: SHIPPED | OUTPATIENT
Start: 2023-09-20

## 2023-09-20 NOTE — ASSESSMENT & PLAN NOTE
She has a history of fibromyalgia and was taking multiple medications prior to pregnancy. She follows with neuro and rheumatology, per her report. She discontinued Orphenadrine and Cyclobenzaprine at positive UPT.  She is in the process of weaning off of Savella.  She is still currently taking Duloxetine daily with plans to discontinue 1 month prior to delivery per recommendation of primary OB.    We have discussed Duloxetine in pregnancy.

## 2023-09-20 NOTE — ASSESSMENT & PLAN NOTE
She reports a history of heart palpitations and slightly elevated pulse.  She states she had a complete cardiac workup at [facility].  She states the only thing that was noted was a slightly elevated pulse.  She has never needed medication for palpitations or tachycardia.  Since becoming pregnant she reports infrequent episodes.  Her pulse today is 105.  We have discussed medication management options and/or repeat cardiac evaluation should these episodes become frequent.  She states she is doing well without medications at this time.

## 2023-09-20 NOTE — PROGRESS NOTES
MATERNAL-FETAL MEDICINE   CONSULT NOTE    Provider requesting consultation: Dr Hale    SUBJECTIVE:     Ms. Rosemarie Good is a 33 y.o.  female with IUP at 14w4d who is seen in consultation by MFM for evaluation and management of:  Problem   1. Diabetes affecting pregnancy in second trimester   2. Thyroid nodule   3. Palpitations   4. Fibromyalgia     She is being referred for a history of insulin resistance and PCOS.  She is been managed by her PCP with Ozempic and Janumet.  She discontinued Ozempic when she was trying to conceive.  She was switched from Janumet to Metformin 1000 mg twice daily at approximately 10 weeks' gestation.  She presents with a sugar log today.  She is checking her values 3 times daily and is sometimes skipping dinner.  She is not following a diabetic diet, however, reports that she has drastically cut back on sugar consumption.  She is not taking a low-dose aspirin daily.  She is not completed baseline A1c or urine protein assessment.  She has a history of thyroid nodules of which she has undergone an ultrasound.  She states the findings did not meet criteria for biopsy.  She denies ever having abnormal thyroid labs or needing medication.  She gets her thyroid labs evaluated annually with her PCP and it was last checked in January.    She has a history of palpitations and has undergone a complete cardiac workup with [facility].  She reports the only finding was a slightly elevated heart rate that has never required medication.    She has a history of fibromyalgia and was taking multiple medications prior to pregnancy.  She discontinued Orphenadrine and Cyclobenzaprine at positive UPT.  She is in the process of weaning off of Savella.  She is still currently taking Duloxetine daily with plans to discontinue 1 month prior to delivery per recommendation of primary OB.       Medication List with Changes/Refills   New Medications    ASPIRIN (ECOTRIN) 81 MG EC TABLET    Take 1 tablet (81 mg  total) by mouth once daily.   Current Medications    FLUTICASONE PROPIONATE (FLONASE) 50 MCG/ACTUATION NASAL SPRAY    2 sprays once daily.    JANUMET 50-1,000 MG PER TABLET    TAKE ONE BY MOUTH TWICE A DAY WITH MEALS    METFORMIN (GLUCOPHAGE) 1000 MG TABLET    Take 1,000 mg by mouth 2 (two) times daily with meals.    METFORMIN (GLUCOPHAGE-XR) 500 MG ER 24HR TABLET    Take 2 tablets (1,000 mg total) by mouth 2 (two) times daily with meals.    MONTELUKAST (SINGULAIR) 10 MG TABLET        OMEPRAZOLE (PRILOSEC) 40 MG CAPSULE        ONDANSETRON (ZOFRAN) 8 MG TABLET    Take 8 mg by mouth every 8 (eight) hours.    PRENATAL 25/IRON FUM/FOLIC/DHA (PRENATAL-1 ORAL)       Discontinued Medications    LETROZOLE (FEMARA) 2.5 MG TAB    Take 1 tablet (2.5 mg total) by mouth once daily Take on day 3 through 7 of cycle..    LETROZOLE (FEMARA) 2.5 MG TAB    Take 2 tablets (5 mg total) by mouth once daily On day 3-7 of cycle..       Review of patient's allergies indicates:  No Known Allergies    PMH:  Past Medical History:   Diagnosis Date    Acid reflux     Allergy     Dyspareunia     Fibromyalgia     Frequent headaches     Insulin resistance     Migraines     PCOS (polycystic ovarian syndrome)     Sinus problem     TMJ (dislocation of temporomandibular joint)        PObHx:  OB History    Para Term  AB Living   1 0 0 0 0 0   SAB IAB Ectopic Multiple Live Births   0 0 0 0 0      # Outcome Date GA Lbr Abdias/2nd Weight Sex Delivery Anes PTL Lv   1 Current                PSH:  Past Surgical History:   Procedure Laterality Date    BIOPSY OF ADENOIDS      TONSILLECTOMY         Family history:family history includes Asthma in her father; Diabetes in her mother; Hyperlipidemia in her father and mother; Hypertension in her mother.    Social history: reports that she has never smoked. She has never used smokeless tobacco. She reports that she does not currently use alcohol. She reports that she does not use drugs.    Genetic history:  " The patient denies any inherited genetic diseases or birth defects in herself or her partner's personal history or family.    Objective:   /81 (BP Location: Left arm, Patient Position: Sitting, BP Method: Medium (Automatic))   Pulse 105   Resp 18   Ht 5' 4" (1.626 m)   Wt 82.2 kg (181 lb 5.3 oz)   LMP  (LMP Unknown)   SpO2 98%   BMI 31.12 kg/m²     Ultrasound performed. See viewpoint for full ultrasound report.    A churchill living IUP is identified, and the biometry is appropriate for established gestational age.    Early, limited anatomy appears normal. The placenta is posterior.    ASSESSMENT/PLAN:     33 y.o.  female with IUP at 14w4d     1. Diabetes affecting pregnancy in second trimester  She has a history of insulin resistance and PCOS. She was taking Ozempic and Janumet prior to pregnancy. She discontinued Ozempic when she was trying to conceive. She was switched from Janumet to Metformin 1000mg BID at approx 10 weeks EGA.     The patient was counseled regarding the risks of diabetes in pregnancy. These risks include but are not limited to an increased risk of , preeclampsia/gestational hypertension, fetal structural anomalies, macrosomia, prematurity, shoulder dystocia/birth injury,  hyperbilirubinemia and electrolyte issues, pulmonary immaturity and sudden stillbirth especially in those patients with poor glucose control. I also discussed with the patient the need for increased fetal surveillance with serial fetal growth assessments and third trimester fetal testing. I also reviewed the possibility of need for early delivery in those with poor glucose control or evidence of fetal growth abnormalities.    She presents with a sugar log today which demonstrates elevated fasting values. She is checking her sugars three times daily. She has some postprandial values out of range. She is not following a diabetic diet, however, does report trying to cut back on sugar intake. We " have provided info for diabetic diet as well as log sheets today.     Recommendations:  Baseline evaluation (to be ordered by primary OB provider):  24 hour urine protein or urine P/C ratio, CBC, CMP (order provided today)  Maternal EKG; echocardiogram if BMI > 30 or EKG is abnormal  Maternal ophthalmic exam (if hasn't been performed in last year) and podiatry exam  Hemoglobin A1C every trimester (order provided today)  Diabetic education referral and counseling re: goal blood sugars (< 95 mg/dl for fasting, < 120 mg/dl for 2 hour postprandial)  Medications:   Start low dose aspirin 81 mg daily at 12-16 weeks for preeclampsia risk reduction  1 mg folic acid daily  Regimen: Metformin 1000mg BID; add Lantus 10u QHS  She will submit her log to our office on a weekly basis via email or portal for review and management   Ultrasounds with MFM:  Targeted anatomy survey at 20 weeks (scheduled with M)  Serial growth ultrasounds q 4-6 weeks at 26-28 weeks (scheduled with [facility])    A fetal echocardiogram is recommended at 22-24 weeks with pediatric cardiology (we will arrange)    Initiate  surveillance at 32 weeks:   Weekly BPP or NST + AFV if good control.   If control is poor, twice weekly  fetal surveillance is recommended with BPP alternating with NST.   Delivery timin 0/7 to 38 and 6/7 weeks if under good control without comorbidities  37 0/7 to 37 and 67 weeks if longstanding diabetes or poorly controlled, polyhydramnios, EFW>90th percentile, or BMI >= 40  36 0/7 to 37 and 6/7 weeks if vascular complications, prior stillbirth or other complicating conditions.  Recommend consideration of earlier delivery if IUGR, HTN, or other complications  Recommend offering  for delivery is EFW is 4500g or more near the time of delivery    Insulin management during labor and delivery (if needed)  -IF AM ADMIT: Give usual dose of intermediate acting (NPH) or long-acting insulin at bedtime the night before  admit  -Hold morning dose of insulin or reduce to ½ dose   -Patients who require insulin should be scheduled as the first available (7 am or 7:30 am)  given the need for NPO status  -Check glucose every 2 hours in latent labor; hourly in active labor  -If blood glucose is less than 70 mg/dl, change IVF to D5 ½ NS at rate of 100-150 cc/hr and monitor blood glucose hourly   -IV infusion of regular insulin is recommended if glucose levels exceed 120 mg/dl  -Patients who are well-controlled with an insulin pump can continue during labor and delivery. Recommend primary OB ensure pump site is out of operative field and confirm pump is compatible with and able to be left on during surgery.    Postpartum management  -Insulin should be reduced by 1/2 of the pre-delivery dose within the first 6-24 hours following delivery.  -Patients who were well-controlled on oral regimens can often return to these following delivery.  -Patients who are breastfeeding should be advised to have small snacks before breastfeeding to reduce the risk of hypoglycemia.  -Patients should be referred to primary MD or Endocrinology for postpartum management.    The patient was advised to call for blood sugars less than 70 or greater than 200 prior to her next appointment. She was also advised that if she notes nausea/vomiting, inability to tolerate PO, or concerns about being able to take her insulin that she should contact her provider or present to the OB ED.        2. Thyroid nodule  She reports a history of thyroid nodules.  She reports having a thyroid ultrasound performed in the past and denies FNA. She also denies abnormal TFTs or need for medication. She gets her thyroid labs checked annually with her PCP and it was last evaluated in January of this year.  We have provided an order for TFTs at today's appointment.    3. Palpitations  She reports a history of heart palpitations and slightly elevated pulse.  She states she had a  complete cardiac workup at [facility].  She states the only thing that was noted was a slightly elevated pulse.  She has never needed medication for palpitations or tachycardia.  Since becoming pregnant she reports infrequent episodes.  Her pulse today is 105.  We have discussed medication management options and/or repeat cardiac evaluation should these episodes become frequent.  She states she is doing well without medications at this time.    4. Fibromyalgia  She has a history of fibromyalgia and was taking multiple medications prior to pregnancy. She follows with neuro and rheumatology, per her report. She discontinued Orphenadrine and Cyclobenzaprine at positive UPT.  She is in the process of weaning off of Savella.  She is still currently taking Duloxetine daily with plans to discontinue 1 month prior to delivery per recommendation of primary OB.    We have discussed Duloxetine in pregnancy.      FOLLOW UP:   F/u in 4-5 weeks for US/MFM visit      This consultation was completed with the assistance of Pati Baker NP.      Trinity Marcano MD  Maternal Fetal Medicine

## 2023-09-20 NOTE — ASSESSMENT & PLAN NOTE
She has a history of insulin resistance and PCOS. She was taking Ozempic and Janumet prior to pregnancy. She discontinued Ozempic when she was trying to conceive. She was switched from Janumet to Metformin 1000mg BID at approx 10 weeks EGA.     The patient was counseled regarding the risks of diabetes in pregnancy. These risks include but are not limited to an increased risk of , preeclampsia/gestational hypertension, fetal structural anomalies, macrosomia, prematurity, shoulder dystocia/birth injury,  hyperbilirubinemia and electrolyte issues, pulmonary immaturity and sudden stillbirth especially in those patients with poor glucose control. I also discussed with the patient the need for increased fetal surveillance with serial fetal growth assessments and third trimester fetal testing. I also reviewed the possibility of need for early delivery in those with poor glucose control or evidence of fetal growth abnormalities.    She presents with a sugar log today which demonstrates elevated fasting values. She is checking her sugars three times daily. She has some postprandial values out of range. She is not following a diabetic diet, however, does report trying to cut back on sugar intake. We have provided info for diabetic diet as well as log sheets today.     Recommendations:  Baseline evaluation (to be ordered by primary OB provider):   24 hour urine protein or urine P/C ratio, CBC, CMP (order provided today)   Maternal EKG; echocardiogram if BMI > 30 or EKG is abnormal   Maternal ophthalmic exam (if hasn't been performed in last year) and podiatry exam   Hemoglobin A1C every trimester (order provided today)   Diabetic education referral and counseling re: goal blood sugars (< 95 mg/dl for fasting, < 120 mg/dl for 2 hour postprandial)  Medications:    Start low dose aspirin 81 mg daily at 12-16 weeks for preeclampsia risk reduction   1 mg folic acid daily   Regimen: Metformin 1000mg BID; add  Lantus 10u QHS   She will submit her log to our office on a weekly basis via email or portal for review and management   Ultrasounds with [facility]:   Targeted anatomy survey at 20 weeks (scheduled with [facility])   Serial growth ultrasounds q 4-6 weeks at 26-28 weeks (scheduled with [facility])    A fetal echocardiogram is recommended at 22-24 weeks with pediatric cardiology (we will arrange)    Initiate  surveillance at 32 weeks:    Weekly BPP or NST + AFV if good control.    If control is poor, twice weekly  fetal surveillance is recommended with BPP alternating with NST.   Delivery timing:   38 0/7 to 38 and 6/7 weeks if under good control without comorbidities   37 0/7 to 37 and 67 weeks if longstanding diabetes or poorly controlled, polyhydramnios, EFW>90th percentile, or BMI >= 40   36 0/7 to 37 and 6/7 weeks if vascular complications, prior stillbirth or other complicating conditions.  Recommend consideration of earlier delivery if IUGR, HTN, or other complications  Recommend offering  for delivery is EFW is 4500g or more near the time of delivery    Insulin management during labor and delivery (if needed)  -IF AM ADMIT: Give usual dose of intermediate acting (NPH) or long-acting insulin at bedtime the night before admit  -Hold morning dose of insulin or reduce to ½ dose   -Patients who require insulin should be scheduled as the first available (7 am or 7:30 am)  given the need for NPO status  -Check glucose every 2 hours in latent labor; hourly in active labor  -If blood glucose is less than 70 mg/dl, change IVF to D5 ½ NS at rate of 100-150 cc/hr and monitor blood glucose hourly   -IV infusion of regular insulin is recommended if glucose levels exceed 120 mg/dl  -Patients who are well-controlled with an insulin pump can continue during labor and delivery. Recommend primary OB ensure pump site is out of operative field and confirm pump is compatible with and able to be left on during  surgery.    Postpartum management  -Insulin should be reduced by 1/2 of the pre-delivery dose within the first 6-24 hours following delivery.  -Patients who were well-controlled on oral regimens can often return to these following delivery.  -Patients who are breastfeeding should be advised to have small snacks before breastfeeding to reduce the risk of hypoglycemia.  -Patients should be referred to primary MD or Endocrinology for postpartum management.    The patient was advised to call for blood sugars less than 70 or greater than 200 prior to her next appointment. She was also advised that if she notes nausea/vomiting, inability to tolerate PO, or concerns about being able to take her insulin that she should contact her provider or present to the OB ED.

## 2023-09-20 NOTE — ASSESSMENT & PLAN NOTE
She reports a history of thyroid nodules.  She reports having a thyroid ultrasound performed in the past and denies FNA. She also denies abnormal TFTs or need for medication. She gets her thyroid labs checked annually with her PCP and it was last evaluated in January of this year.  We have provided an order for TFTs at today's appointment.

## 2023-09-22 ENCOUNTER — LAB VISIT (OUTPATIENT)
Dept: LAB | Facility: HOSPITAL | Age: 33
End: 2023-09-22
Attending: NURSE PRACTITIONER
Payer: COMMERCIAL

## 2023-09-22 DIAGNOSIS — R00.2 PALPITATIONS: ICD-10-CM

## 2023-09-22 DIAGNOSIS — O24.912 DIABETES MELLITUS AFFECTING PREGNANCY IN SECOND TRIMESTER: ICD-10-CM

## 2023-09-22 DIAGNOSIS — E04.1 THYROID NODULE: Primary | ICD-10-CM

## 2023-09-22 LAB
ALBUMIN SERPL-MCNC: 3.5 G/DL (ref 3.5–5)
ALBUMIN/GLOB SERPL: 1.2 RATIO (ref 1.1–2)
ALP SERPL-CCNC: 48 UNIT/L (ref 40–150)
ALT SERPL-CCNC: 10 UNIT/L (ref 0–55)
AST SERPL-CCNC: 10 UNIT/L (ref 5–34)
BILIRUB SERPL-MCNC: 0.2 MG/DL
BUN SERPL-MCNC: 7.8 MG/DL (ref 7–18.7)
CALCIUM SERPL-MCNC: 9.4 MG/DL (ref 8.4–10.2)
CHLORIDE SERPL-SCNC: 105 MMOL/L (ref 98–107)
CO2 SERPL-SCNC: 21 MMOL/L (ref 22–29)
CREAT SERPL-MCNC: 0.63 MG/DL (ref 0.55–1.02)
CREAT UR-MCNC: 84.6 MG/DL (ref 45–106)
EST. AVERAGE GLUCOSE BLD GHB EST-MCNC: 102.5 MG/DL
GFR SERPLBLD CREATININE-BSD FMLA CKD-EPI: >60 MLS/MIN/1.73/M2
GLOBULIN SER-MCNC: 2.9 GM/DL (ref 2.4–3.5)
GLUCOSE SERPL-MCNC: 92 MG/DL (ref 74–100)
HBA1C MFR BLD: 5.2 %
POTASSIUM SERPL-SCNC: 3.9 MMOL/L (ref 3.5–5.1)
PROT SERPL-MCNC: 6.4 GM/DL (ref 6.4–8.3)
PROT UR STRIP-MCNC: <6.8 MG/DL
SODIUM SERPL-SCNC: 136 MMOL/L (ref 136–145)
T4 FREE SERPL-MCNC: 0.76 NG/DL (ref 0.7–1.48)
TSH SERPL-ACNC: 2.61 UIU/ML (ref 0.35–4.94)

## 2023-09-22 PROCEDURE — 82570 ASSAY OF URINE CREATININE: CPT

## 2023-09-22 PROCEDURE — 84443 ASSAY THYROID STIM HORMONE: CPT

## 2023-09-22 PROCEDURE — 83036 HEMOGLOBIN GLYCOSYLATED A1C: CPT

## 2023-09-22 PROCEDURE — 84439 ASSAY OF FREE THYROXINE: CPT

## 2023-09-22 PROCEDURE — 80053 COMPREHEN METABOLIC PANEL: CPT

## 2023-09-22 PROCEDURE — 36415 COLL VENOUS BLD VENIPUNCTURE: CPT

## 2023-09-25 ENCOUNTER — PATIENT MESSAGE (OUTPATIENT)
Dept: MATERNAL FETAL MEDICINE | Facility: CLINIC | Age: 33
End: 2023-09-25
Payer: COMMERCIAL

## 2023-09-29 DIAGNOSIS — O24.112 PRE-EXISTING TYPE 2 DIABETES MELLITUS DURING PREGNANCY IN SECOND TRIMESTER: Primary | ICD-10-CM

## 2023-09-29 RX ORDER — INSULIN PUMP SYRINGE, 3 ML
EACH MISCELLANEOUS
Qty: 1 EACH | Refills: 0 | Status: SHIPPED | OUTPATIENT
Start: 2023-09-29 | End: 2023-10-31 | Stop reason: SDUPTHER

## 2023-09-29 RX ORDER — LANCETS
EACH MISCELLANEOUS
Qty: 100 EACH | Refills: 4 | Status: SHIPPED | OUTPATIENT
Start: 2023-09-29

## 2023-10-03 LAB
C TRACH RRNA SPEC QL PROBE: NEGATIVE
N GONORRHOEAE, AMPLIFIED DNA: NEGATIVE

## 2023-10-23 ENCOUNTER — TELEPHONE (OUTPATIENT)
Dept: MATERNAL FETAL MEDICINE | Facility: CLINIC | Age: 33
End: 2023-10-23
Payer: COMMERCIAL

## 2023-10-23 NOTE — TELEPHONE ENCOUNTER
We received pt's BS log, it was reviewed by Pati, she reports log looks good no changes need to be made, send another log in 1wk.    I called pt informed pt of rec. Per Pati, pt verbalized understanding.

## 2023-10-26 DIAGNOSIS — O24.112 PRE-EXISTING TYPE 2 DIABETES MELLITUS DURING PREGNANCY IN SECOND TRIMESTER: Primary | ICD-10-CM

## 2023-10-26 DIAGNOSIS — E04.1 THYROID NODULE: ICD-10-CM

## 2023-10-26 DIAGNOSIS — O24.912 DIABETES MELLITUS AFFECTING PREGNANCY IN SECOND TRIMESTER: ICD-10-CM

## 2023-10-30 ENCOUNTER — TELEPHONE (OUTPATIENT)
Dept: MATERNAL FETAL MEDICINE | Facility: CLINIC | Age: 33
End: 2023-10-30
Payer: COMMERCIAL

## 2023-10-30 NOTE — TELEPHONE ENCOUNTER
I called pt regarding her BS log and rec. From [place], Metformin 1000 mg BID & Levemir 14u QHS will stay the same, we will need to add Levemir 10u qam w/ breakfast. Pt verbalized understanding, no refill needed at this time, she will send another long in 1 wk.

## 2023-10-31 ENCOUNTER — PROCEDURE VISIT (OUTPATIENT)
Dept: MATERNAL FETAL MEDICINE | Facility: CLINIC | Age: 33
End: 2023-10-31
Payer: COMMERCIAL

## 2023-10-31 ENCOUNTER — OFFICE VISIT (OUTPATIENT)
Dept: MATERNAL FETAL MEDICINE | Facility: CLINIC | Age: 33
End: 2023-10-31
Payer: COMMERCIAL

## 2023-10-31 VITALS
WEIGHT: 188.69 LBS | HEART RATE: 94 BPM | BODY MASS INDEX: 32.39 KG/M2 | SYSTOLIC BLOOD PRESSURE: 119 MMHG | DIASTOLIC BLOOD PRESSURE: 78 MMHG

## 2023-10-31 DIAGNOSIS — E04.1 THYROID NODULE: ICD-10-CM

## 2023-10-31 DIAGNOSIS — O24.912 DIABETES MELLITUS AFFECTING PREGNANCY IN SECOND TRIMESTER: ICD-10-CM

## 2023-10-31 DIAGNOSIS — R00.2 PALPITATIONS: ICD-10-CM

## 2023-10-31 DIAGNOSIS — M79.7 FIBROMYALGIA: ICD-10-CM

## 2023-10-31 DIAGNOSIS — O24.112 PRE-EXISTING TYPE 2 DIABETES MELLITUS DURING PREGNANCY IN SECOND TRIMESTER: ICD-10-CM

## 2023-10-31 PROCEDURE — 3078F PR MOST RECENT DIASTOLIC BLOOD PRESSURE < 80 MM HG: ICD-10-PCS | Mod: CPTII,S$GLB,, | Performed by: OBSTETRICS & GYNECOLOGY

## 2023-10-31 PROCEDURE — 76811 OB US DETAILED SNGL FETUS: CPT | Mod: S$GLB,,, | Performed by: OBSTETRICS & GYNECOLOGY

## 2023-10-31 PROCEDURE — 99214 OFFICE O/P EST MOD 30 MIN: CPT | Mod: S$GLB,,, | Performed by: OBSTETRICS & GYNECOLOGY

## 2023-10-31 PROCEDURE — 3078F DIAST BP <80 MM HG: CPT | Mod: CPTII,S$GLB,, | Performed by: OBSTETRICS & GYNECOLOGY

## 2023-10-31 PROCEDURE — 1160F PR REVIEW ALL MEDS BY PRESCRIBER/CLIN PHARMACIST DOCUMENTED: ICD-10-PCS | Mod: CPTII,S$GLB,, | Performed by: OBSTETRICS & GYNECOLOGY

## 2023-10-31 PROCEDURE — 3074F SYST BP LT 130 MM HG: CPT | Mod: CPTII,S$GLB,, | Performed by: OBSTETRICS & GYNECOLOGY

## 2023-10-31 PROCEDURE — 1159F MED LIST DOCD IN RCRD: CPT | Mod: CPTII,S$GLB,, | Performed by: OBSTETRICS & GYNECOLOGY

## 2023-10-31 PROCEDURE — 1159F PR MEDICATION LIST DOCUMENTED IN MEDICAL RECORD: ICD-10-PCS | Mod: CPTII,S$GLB,, | Performed by: OBSTETRICS & GYNECOLOGY

## 2023-10-31 PROCEDURE — 1160F RVW MEDS BY RX/DR IN RCRD: CPT | Mod: CPTII,S$GLB,, | Performed by: OBSTETRICS & GYNECOLOGY

## 2023-10-31 PROCEDURE — 76811 PR US, OB FETAL EVAL & EXAM, TRANSABDOM,FIRST GESTATION: ICD-10-PCS | Mod: S$GLB,,, | Performed by: OBSTETRICS & GYNECOLOGY

## 2023-10-31 PROCEDURE — 3008F BODY MASS INDEX DOCD: CPT | Mod: CPTII,S$GLB,, | Performed by: OBSTETRICS & GYNECOLOGY

## 2023-10-31 PROCEDURE — 3074F PR MOST RECENT SYSTOLIC BLOOD PRESSURE < 130 MM HG: ICD-10-PCS | Mod: CPTII,S$GLB,, | Performed by: OBSTETRICS & GYNECOLOGY

## 2023-10-31 PROCEDURE — 3044F HG A1C LEVEL LT 7.0%: CPT | Mod: CPTII,S$GLB,, | Performed by: OBSTETRICS & GYNECOLOGY

## 2023-10-31 PROCEDURE — 99214 PR OFFICE/OUTPT VISIT, EST, LEVL IV, 30-39 MIN: ICD-10-PCS | Mod: S$GLB,,, | Performed by: OBSTETRICS & GYNECOLOGY

## 2023-10-31 PROCEDURE — 3044F PR MOST RECENT HEMOGLOBIN A1C LEVEL <7.0%: ICD-10-PCS | Mod: CPTII,S$GLB,, | Performed by: OBSTETRICS & GYNECOLOGY

## 2023-10-31 PROCEDURE — 3008F PR BODY MASS INDEX (BMI) DOCUMENTED: ICD-10-PCS | Mod: CPTII,S$GLB,, | Performed by: OBSTETRICS & GYNECOLOGY

## 2023-10-31 RX ORDER — BLOOD-GLUCOSE METER
EACH MISCELLANEOUS
COMMUNITY
Start: 2023-09-29

## 2023-10-31 RX ORDER — DULOXETIN HYDROCHLORIDE 20 MG/1
20 CAPSULE, DELAYED RELEASE ORAL DAILY
COMMUNITY
Start: 2023-10-10

## 2023-10-31 NOTE — ASSESSMENT & PLAN NOTE
She has a history of insulin resistance and PCOS. She was taking Ozempic and Janumet prior to pregnancy. She discontinued Ozempic when she was trying to conceive. She was switched from Janumet to Metformin 1000mg BID at approx 10 weeks EGA.     We have reviewed the risks of diabetes in pregnancy. These risks include but are not limited to an increased risk of , preeclampsia/gestational hypertension, fetal structural anomalies, macrosomia, prematurity, shoulder dystocia/birth injury,  hyperbilirubinemia and electrolyte issues, pulmonary immaturity and sudden stillbirth especially in those patients with poor glucose control. I also discussed with the patient the need for increased fetal surveillance with serial fetal growth assessments and third trimester fetal testing. I also reviewed the possibility of need for early delivery in those with poor glucose control or evidence of fetal growth abnormalities.    She is currently taking Metformin 1000mg BID, Levemir 10u QAM and 14u QHS. She has been submitting her log routinely to our office for review. Changes were made to her regimen yesterday, therefore, no changes were made today.    Recommendations:  Baseline evaluation (to be ordered by primary OB provider):   24 hour urine protein or urine P/C ratio, CBC, CMP (completed)   Maternal EKG; echocardiogram if BMI > 30 or EKG is abnormal   Maternal ophthalmic exam (if hasn't been performed in last year) and podiatry exam   Hemoglobin A1C every trimester (completed )   Diabetic education referral and counseling re: goal blood sugars (< 95 mg/dl for fasting, < 120 mg/dl for 2 hour postprandial)  Medications:    Start low dose aspirin 81 mg daily at 12-16 weeks for preeclampsia risk reduction   1 mg folic acid daily   Regimen: Metformin 1000mg BID; Levemir 10u QAM/14u QHS   She will submit her log to our office on a weekly basis via email or portal for review and management   Ultrasounds with  MFM:   Targeted anatomy survey at 20 weeks (started today, some views limited)   Serial growth ultrasounds q 4-6 weeks at 26-28 weeks (scheduled w/ MFM)    A fetal echocardiogram is recommended at 22-24 weeks with pediatric cardiology (scheduled 23)    Initiate  surveillance at 32 weeks:    Weekly BPP or NST + AFV if good control.    If control is poor, twice weekly  fetal surveillance is recommended with BPP alternating with NST.   Delivery timing:   38 0/7 to 38 and 6/7 weeks if under good control without comorbidities   37 0/7 to 37 and 67 weeks if longstanding diabetes or poorly controlled, polyhydramnios, EFW>90th percentile, or BMI >= 40   36 0/7 to 37 and 6/7 weeks if vascular complications, prior stillbirth or other complicating conditions.  Recommend consideration of earlier delivery if IUGR, HTN, or other complications  Recommend offering  for delivery is EFW is 4500g or more near the time of delivery    Insulin management during labor and delivery (if needed)  -IF AM ADMIT: Give usual dose of intermediate acting (NPH) or long-acting insulin at bedtime the night before admit  -Hold morning dose of insulin or reduce to ½ dose   -Patients who require insulin should be scheduled as the first available (7 am or 7:30 am)  given the need for NPO status  -Check glucose every 2 hours in latent labor; hourly in active labor  -If blood glucose is less than 70 mg/dl, change IVF to D5 ½ NS at rate of 100-150 cc/hr and monitor blood glucose hourly   -IV infusion of regular insulin is recommended if glucose levels exceed 120 mg/dl  -Patients who are well-controlled with an insulin pump can continue during labor and delivery. Recommend primary OB ensure pump site is out of operative field and confirm pump is compatible with and able to be left on during surgery.    Postpartum management  -Insulin should be reduced by 1/2 of the pre-delivery dose within the first 6-24 hours  following delivery.  -Patients who were well-controlled on oral regimens can often return to these following delivery.  -Patients who are breastfeeding should be advised to have small snacks before breastfeeding to reduce the risk of hypoglycemia.  -Patients should be referred to primary MD or Endocrinology for postpartum management.    The patient was advised to call for blood sugars less than 70 or greater than 200 prior to her next appointment. She was also advised that if she notes nausea/vomiting, inability to tolerate PO, or concerns about being able to take her insulin that she should contact her provider or present to the OB ED.

## 2023-10-31 NOTE — ASSESSMENT & PLAN NOTE
She reports a history of heart palpitations and slightly elevated pulse.  She states she had a complete cardiac workup at [facility].  She states the only thing that was noted was a slightly elevated pulse.  She has never needed medication for palpitations or tachycardia.  Since becoming pregnant she reports infrequent episodes.  Her pulse today is 105.  We have discussed medication management options and/or repeat cardiac evaluation should these episodes become frequent.  She states she is doing well without medications at this time.

## 2023-10-31 NOTE — ASSESSMENT & PLAN NOTE
She reports a history of thyroid nodules.  She reports having a thyroid ultrasound performed in the past and denies FNA. She also denies abnormal TFTs or need for medication. She gets her thyroid labs checked annually with her PCP and it was last evaluated in January of this year.  TFTs were reassuring.

## 2023-10-31 NOTE — PROGRESS NOTES
"Maternal Fetal Medicine follow up consult    SUBJECTIVE:     Rosemarie Good is a 33 y.o.  female with IUP at 20w3d who is seen in follow up consultation by MFM.  Pregnancy complications include:   Problem   1. Diabetes affecting pregnancy in second trimester   2. Thyroid nodule   3. Palpitations   4. Fibromyalgia     Rosemarie has been doing very well. She is taking her medications for diabetes as prescribed and submits her log weekly to our office for review/mgmt. We have just placed her on morning insulin yesterday.  Targeted anatomy ultrasound today.  Fibromyalgia is stable but has some back pain. I advised trying lidocaine patch.    Previous notes reviewed.   No changes to medical, surgical, family, social, or obstetric history.    Interval history since last M visit: see above    Medications:  Current Outpatient Medications   Medication Instructions    aspirin (ECOTRIN) 81 mg, Oral, Daily    blood sugar diagnostic Strp To check BG 4 times daily, to use with insurance preferred meter    DULoxetine (CYMBALTA) 20 MG capsule Oral, 2 times daily    fluticasone propionate (FLONASE) 50 mcg/actuation nasal spray 2 sprays, Daily    lancets Misc To check BG 4 times daily, to use with insurance preferred meter    LEVEMIR FLEXPEN 10 Units, Subcutaneous, Nightly    metFORMIN (GLUCOPHAGE) 1,000 mg, Oral, 2 times daily with meals    montelukast (SINGULAIR) 10 mg tablet No dose, route, or frequency recorded.    omeprazole (PRILOSEC) 40 MG capsule No dose, route, or frequency recorded.    ondansetron (ZOFRAN) 8 mg, Oral, Every 8 hours (non-standard times)    pen needle, diabetic (PEN NEEDLE) 31 gauge x 5/16" Ndle 1 application , Misc.(Non-Drug; Combo Route), Nightly, Use pen needle to inject insulin every night at bedtime    prenatal 25/iron fum/folic/dha (PRENATAL-1 ORAL) No dose, route, or frequency recorded.    TRUE METRIX GLUCOSE METER Misc No dose, route, or frequency recorded.       Care team members:  Dr" Alexia - Primary OB     OBJECTIVE:   /78   Pulse 94   Wt 85.6 kg (188 lb 11.4 oz)   LMP  (LMP Unknown)   BMI 32.39 kg/m²     Ultrasound performed. See viewpoint for full ultrasound report.    A detailed fetal anatomic ultrasound examination was performed for the following high risk indication: Pre-gestational DM.   No fetal structural malformations are identified; however, fetal imaging is incomplete today for 3VTV, septum and transverse spine.  A follow-up study will be scheduled to complete the fetal anatomic survey.   Fetal size today is consistent with established gestational age.   Cervical length by TA scanning is normal.   Placental location is posterior without evidence of previa.   Two fibroids are present, measuring 2.7 x 1.5 x 1.6 and 3.7 x 2.6 x 4.8cm. They are both located anteriorly.    ASSESSMENT/PLAN:     33 y.o.  female with IUP at 20w3d    1. Diabetes affecting pregnancy in second trimester  She has a history of insulin resistance and PCOS. She was taking Ozempic and Janumet prior to pregnancy. She discontinued Ozempic when she was trying to conceive. She was switched from Janumet to Metformin 1000mg BID at approx 10 weeks EGA.     We have reviewed the risks of diabetes in pregnancy. These risks include but are not limited to an increased risk of , preeclampsia/gestational hypertension, fetal structural anomalies, macrosomia, prematurity, shoulder dystocia/birth injury,  hyperbilirubinemia and electrolyte issues, pulmonary immaturity and sudden stillbirth especially in those patients with poor glucose control. I also discussed with the patient the need for increased fetal surveillance with serial fetal growth assessments and third trimester fetal testing. I also reviewed the possibility of need for early delivery in those with poor glucose control or evidence of fetal growth abnormalities.    She is currently taking Metformin 1000mg BID, Levemir 10u QAM and 14u  QHS. She has been submitting her log routinely to our office for review. Changes were made to her regimen yesterday, therefore, no changes were made today.    Recommendations:  Baseline evaluation (to be ordered by primary OB provider):  24 hour urine protein or urine P/C ratio, CBC, CMP (completed)  Maternal EKG; echocardiogram if BMI > 30 or EKG is abnormal  Maternal ophthalmic exam (if hasn't been performed in last year) and podiatry exam  Hemoglobin A1C every trimester (completed )  Diabetic education referral and counseling re: goal blood sugars (< 95 mg/dl for fasting, < 120 mg/dl for 2 hour postprandial)  Medications:   Start low dose aspirin 81 mg daily at 12-16 weeks for preeclampsia risk reduction  1 mg folic acid daily  Regimen: Metformin 1000mg BID; Levemir 10u QAM/14u QHS  She will submit her log to our office on a weekly basis via email or portal for review and management   Ultrasounds with MFM:  Targeted anatomy survey at 20 weeks (started today, some views limited)  Serial growth ultrasounds q 4-6 weeks at 26-28 weeks (scheduled w/ [facility])    A fetal echocardiogram is recommended at 22-24 weeks with pediatric cardiology (scheduled 23)    Initiate  surveillance at 32 weeks:   Weekly BPP or NST + AFV if good control.   If control is poor, twice weekly  fetal surveillance is recommended with BPP alternating with NST.   Delivery timin 0/7 to 38 and 6/7 weeks if under good control without comorbidities  37 0/7 to 37 and 67 weeks if longstanding diabetes or poorly controlled, polyhydramnios, EFW>90th percentile, or BMI >= 40  36 0/7 to 37 and 6/7 weeks if vascular complications, prior stillbirth or other complicating conditions.  Recommend consideration of earlier delivery if IUGR, HTN, or other complications  Recommend offering  for delivery is EFW is 4500g or more near the time of delivery    Insulin management during labor and delivery (if needed)  -IF AM  ADMIT: Give usual dose of intermediate acting (NPH) or long-acting insulin at bedtime the night before admit  -Hold morning dose of insulin or reduce to ½ dose   -Patients who require insulin should be scheduled as the first available (7 am or 7:30 am)  given the need for NPO status  -Check glucose every 2 hours in latent labor; hourly in active labor  -If blood glucose is less than 70 mg/dl, change IVF to D5 ½ NS at rate of 100-150 cc/hr and monitor blood glucose hourly   -IV infusion of regular insulin is recommended if glucose levels exceed 120 mg/dl  -Patients who are well-controlled with an insulin pump can continue during labor and delivery. Recommend primary OB ensure pump site is out of operative field and confirm pump is compatible with and able to be left on during surgery.    Postpartum management  -Insulin should be reduced by 1/2 of the pre-delivery dose within the first 6-24 hours following delivery.  -Patients who were well-controlled on oral regimens can often return to these following delivery.  -Patients who are breastfeeding should be advised to have small snacks before breastfeeding to reduce the risk of hypoglycemia.  -Patients should be referred to primary MD or Endocrinology for postpartum management.    The patient was advised to call for blood sugars less than 70 or greater than 200 prior to her next appointment. She was also advised that if she notes nausea/vomiting, inability to tolerate PO, or concerns about being able to take her insulin that she should contact her provider or present to the OB ED.        2. Thyroid nodule  She reports a history of thyroid nodules.  She reports having a thyroid ultrasound performed in the past and denies FNA. She also denies abnormal TFTs or need for medication. She gets her thyroid labs checked annually with her PCP and it was last evaluated in January of this year.  TFTs were reassuring.    3. Palpitations  She reports a history of heart  palpitations and slightly elevated pulse.  She states she had a complete cardiac workup at [facility].  She states the only thing that was noted was a slightly elevated pulse.  She has never needed medication for palpitations or tachycardia.  Since becoming pregnant she reports infrequent episodes.  Her pulse today is 105.  We have discussed medication management options and/or repeat cardiac evaluation should these episodes become frequent.  She states she is doing well without medications at this time.    4. Fibromyalgia  She has a history of fibromyalgia and was taking multiple medications prior to pregnancy. She follows with neuro and rheumatology, per her report. She discontinued Orphenadrine and Cyclobenzaprine at positive UPT.  She has weaned off the savella (SNRI).  She is still currently taking Duloxetine daily with plans to discontinue 1 month prior to delivery per recommendation of primary OB.    We have discussed Duloxetine in pregnancy.    F/u in 4 weeks for MFM visit/ultrasound    Trinity Marcano MD  Maternal Fetal Medicine

## 2023-10-31 NOTE — ASSESSMENT & PLAN NOTE
She has a history of fibromyalgia and was taking multiple medications prior to pregnancy. She follows with neuro and rheumatology, per her report. She discontinued Orphenadrine and Cyclobenzaprine at positive UPT.  She has weaned off the savella (SNRI).  She is still currently taking Duloxetine daily with plans to discontinue 1 month prior to delivery per recommendation of primary OB.    We have discussed Duloxetine in pregnancy.

## 2023-11-14 ENCOUNTER — CLINICAL SUPPORT (OUTPATIENT)
Dept: PEDIATRIC CARDIOLOGY | Facility: CLINIC | Age: 33
End: 2023-11-14
Payer: COMMERCIAL

## 2023-11-14 ENCOUNTER — OFFICE VISIT (OUTPATIENT)
Dept: PEDIATRIC CARDIOLOGY | Facility: CLINIC | Age: 33
End: 2023-11-14
Payer: COMMERCIAL

## 2023-11-14 VITALS
HEART RATE: 94 BPM | WEIGHT: 189.63 LBS | HEIGHT: 64 IN | BODY MASS INDEX: 32.37 KG/M2 | DIASTOLIC BLOOD PRESSURE: 75 MMHG | SYSTOLIC BLOOD PRESSURE: 127 MMHG | RESPIRATION RATE: 18 BRPM

## 2023-11-14 DIAGNOSIS — O24.912 DIABETES MELLITUS AFFECTING PREGNANCY IN SECOND TRIMESTER: ICD-10-CM

## 2023-11-14 DIAGNOSIS — O24.912 DIABETES MELLITUS AFFECTING PREGNANCY IN SECOND TRIMESTER: Primary | ICD-10-CM

## 2023-11-14 PROCEDURE — 99202 PR OFFICE/OUTPT VISIT, NEW, LEVL II, 15-29 MIN: ICD-10-PCS | Mod: 25,S$GLB,, | Performed by: PEDIATRICS

## 2023-11-14 PROCEDURE — 3044F HG A1C LEVEL LT 7.0%: CPT | Mod: CPTII,S$GLB,, | Performed by: PEDIATRICS

## 2023-11-14 PROCEDURE — 76827 PR  SO2 FETAL HEART DOPPLER: ICD-10-PCS | Mod: S$GLB,,, | Performed by: PEDIATRICS

## 2023-11-14 PROCEDURE — 3008F BODY MASS INDEX DOCD: CPT | Mod: CPTII,S$GLB,, | Performed by: PEDIATRICS

## 2023-11-14 PROCEDURE — 76827 ECHO EXAM OF FETAL HEART: CPT | Mod: S$GLB,,, | Performed by: PEDIATRICS

## 2023-11-14 PROCEDURE — 99202 OFFICE O/P NEW SF 15 MIN: CPT | Mod: 25,S$GLB,, | Performed by: PEDIATRICS

## 2023-11-14 PROCEDURE — 3078F DIAST BP <80 MM HG: CPT | Mod: CPTII,S$GLB,, | Performed by: PEDIATRICS

## 2023-11-14 PROCEDURE — 93325 PR DOPPLER COLOR FLOW VELOCITY MAP: ICD-10-PCS | Mod: S$GLB,,, | Performed by: PEDIATRICS

## 2023-11-14 PROCEDURE — 3078F PR MOST RECENT DIASTOLIC BLOOD PRESSURE < 80 MM HG: ICD-10-PCS | Mod: CPTII,S$GLB,, | Performed by: PEDIATRICS

## 2023-11-14 PROCEDURE — 93325 DOPPLER ECHO COLOR FLOW MAPG: CPT | Mod: S$GLB,,, | Performed by: PEDIATRICS

## 2023-11-14 PROCEDURE — 76825 PR  SO2 FETAL HEART: ICD-10-PCS | Mod: S$GLB,,, | Performed by: PEDIATRICS

## 2023-11-14 PROCEDURE — 3044F PR MOST RECENT HEMOGLOBIN A1C LEVEL <7.0%: ICD-10-PCS | Mod: CPTII,S$GLB,, | Performed by: PEDIATRICS

## 2023-11-14 PROCEDURE — 76825 ECHO EXAM OF FETAL HEART: CPT | Mod: S$GLB,,, | Performed by: PEDIATRICS

## 2023-11-14 PROCEDURE — 3008F PR BODY MASS INDEX (BMI) DOCUMENTED: ICD-10-PCS | Mod: CPTII,S$GLB,, | Performed by: PEDIATRICS

## 2023-11-14 PROCEDURE — 3074F SYST BP LT 130 MM HG: CPT | Mod: CPTII,S$GLB,, | Performed by: PEDIATRICS

## 2023-11-14 PROCEDURE — 3074F PR MOST RECENT SYSTOLIC BLOOD PRESSURE < 130 MM HG: ICD-10-PCS | Mod: CPTII,S$GLB,, | Performed by: PEDIATRICS

## 2023-11-14 NOTE — PROGRESS NOTES
"[facility] - Pediatric Cardiology Fetal Cardiology Clinic    Today, I had the pleasure of evaluating Rosemarie Good who is now 33 y.o. and carrying her first pregnancy at 20 5/7 weeks gestation with an RUFUS of 3/16/24. She was referred for evaluation of the fetal heart due to maternal diabetes mellitus. She is on insulin with good control of her glucose.     She is carrying a male fetus, to be named Nathaniel.  Pregnancy thus far has been otherwise uncomplicated.     Obstetric History:    .  Her OB history is otherwise unremarkable.     Past Medical History:   Diagnosis Date    Acid reflux     Allergy     Dyspareunia     Fibromyalgia     Frequent headaches     Insulin resistance     Migraines     PCOS (polycystic ovarian syndrome)     Sinus problem     TMJ (dislocation of temporomandibular joint)          Current Outpatient Medications:     aspirin (ECOTRIN) 81 MG EC tablet, Take 1 tablet (81 mg total) by mouth once daily., Disp: 60 tablet, Rfl: 3    blood sugar diagnostic Strp, To check BG 4 times daily, to use with insurance preferred meter, Disp: 100 strip, Rfl: 4    DULoxetine (CYMBALTA) 20 MG capsule, Take by mouth 2 (two) times daily., Disp: , Rfl:     fluticasone propionate (FLONASE) 50 mcg/actuation nasal spray, 2 sprays once daily., Disp: , Rfl:     insulin detemir U-100, Levemir, (LEVEMIR FLEXPEN) 100 unit/mL (3 mL) InPn pen, Inject 10 Units into the skin every evening., Disp: 3 mL, Rfl: 3    lancets Misc, To check BG 4 times daily, to use with insurance preferred meter, Disp: 100 each, Rfl: 4    metFORMIN (GLUCOPHAGE) 1000 MG tablet, Take 1,000 mg by mouth 2 (two) times daily with meals., Disp: , Rfl:     montelukast (SINGULAIR) 10 mg tablet, , Disp: , Rfl:     omeprazole (PRILOSEC) 40 MG capsule, , Disp: , Rfl:     ondansetron (ZOFRAN) 8 MG tablet, Take 8 mg by mouth every 8 (eight) hours., Disp: , Rfl:     pen needle, diabetic (PEN NEEDLE) 31 gauge x 5/16" Ndle, 1 application  by " Misc.(Non-Drug; Combo Route) route nightly. Use pen needle to inject insulin every night at bedtime, Disp: 30 each, Rfl: 1    prenatal 25/iron fum/folic/dha (PRENATAL-1 ORAL), , Disp: , Rfl:     TRUE METRIX GLUCOSE METER Misc, , Disp: , Rfl:      Review of patient's allergies indicates:  No Known Allergies    Family History: Negative for congenital heart disease, early coronary artery disease, sudden unexplained death, connective tissues disorders, genetic syndromes, multiple miscarriages or other congenital anomalies.    Social History: Ms. Rosemarie Good is  to the father of the baby.  The father of the baby is involved.     FETAL ECHOCARDIOGRAM (summary):  Fetal echo at 20 5/7 wga  Normal fetal echocardiogram  (Full report in electronic medical record)    Impression:  Single active male fetus at 20 5/7 wga.  Normal fetal echocardiogram.      Todays fetal echocardiogram is normal, within the limitations of fetal echocardiography.  I discussed with her that fetal echocardiography is insufficiently sensitive to rule out all septal defects, anomalies of pulmonary and systemic veins, arch anomalies, and some valvar abnormalities, nor can it ensure that the ductus arteriosus and foramen ovale will spontaneously close.     Recommendations:  I discussed the continued importance of tight control of glucose levels throughout the remainder of the pregnancy given the concern of developing ventricular septal hypertrophy with poor glucose control, particularly in the third trimester.    Location, timing, and mode of delivery will be determined by the obstetrical team.  She does not require further follow-up in the fetal echocardiography clinic, but I would be happy to see her again if additional questions or concerns arise.    Should there be any concerns about the baby's heart after birth, a post- echocardiogram and cardiology consultation are recommended.           Lou Shah MD, MSCI  Pediatric  Cardiology  Pediatric Echocardiography, Fetal Echocardiography, Cardiac MRI  [facility] [address] [place]  [address]  Phone [phone], Fax [phone]      The above information was discussed in detail including the use of diagrams, with 30 minutes of total face to face time, with greater than 50% with counseling and coordination of care.  The discussion of the diagnosis and treatment options is as described above.

## 2023-11-21 DIAGNOSIS — O24.912 DIABETES MELLITUS AFFECTING PREGNANCY IN SECOND TRIMESTER: Primary | ICD-10-CM

## 2023-11-28 ENCOUNTER — OFFICE VISIT (OUTPATIENT)
Dept: MATERNAL FETAL MEDICINE | Facility: CLINIC | Age: 33
End: 2023-11-28
Payer: COMMERCIAL

## 2023-11-28 ENCOUNTER — PROCEDURE VISIT (OUTPATIENT)
Dept: MATERNAL FETAL MEDICINE | Facility: CLINIC | Age: 33
End: 2023-11-28
Payer: COMMERCIAL

## 2023-11-28 VITALS
WEIGHT: 193.31 LBS | DIASTOLIC BLOOD PRESSURE: 70 MMHG | SYSTOLIC BLOOD PRESSURE: 117 MMHG | HEART RATE: 88 BPM | BODY MASS INDEX: 33.19 KG/M2

## 2023-11-28 DIAGNOSIS — R00.2 PALPITATIONS: ICD-10-CM

## 2023-11-28 DIAGNOSIS — M79.7 FIBROMYALGIA: ICD-10-CM

## 2023-11-28 DIAGNOSIS — O24.912 DIABETES MELLITUS AFFECTING PREGNANCY IN SECOND TRIMESTER: ICD-10-CM

## 2023-11-28 DIAGNOSIS — E04.1 THYROID NODULE: Primary | ICD-10-CM

## 2023-11-28 PROCEDURE — 3078F DIAST BP <80 MM HG: CPT | Mod: CPTII,S$GLB,, | Performed by: OBSTETRICS & GYNECOLOGY

## 2023-11-28 PROCEDURE — 1160F RVW MEDS BY RX/DR IN RCRD: CPT | Mod: CPTII,S$GLB,, | Performed by: OBSTETRICS & GYNECOLOGY

## 2023-11-28 PROCEDURE — 3078F PR MOST RECENT DIASTOLIC BLOOD PRESSURE < 80 MM HG: ICD-10-PCS | Mod: CPTII,S$GLB,, | Performed by: OBSTETRICS & GYNECOLOGY

## 2023-11-28 PROCEDURE — 1159F MED LIST DOCD IN RCRD: CPT | Mod: CPTII,S$GLB,, | Performed by: OBSTETRICS & GYNECOLOGY

## 2023-11-28 PROCEDURE — 3008F BODY MASS INDEX DOCD: CPT | Mod: CPTII,S$GLB,, | Performed by: OBSTETRICS & GYNECOLOGY

## 2023-11-28 PROCEDURE — 99214 OFFICE O/P EST MOD 30 MIN: CPT | Mod: S$GLB,,, | Performed by: OBSTETRICS & GYNECOLOGY

## 2023-11-28 PROCEDURE — 76816 PR  US,PREGNANT UTERUS,F/U,TRANSABD APP: ICD-10-PCS | Mod: S$GLB,,, | Performed by: OBSTETRICS & GYNECOLOGY

## 2023-11-28 PROCEDURE — 3044F HG A1C LEVEL LT 7.0%: CPT | Mod: CPTII,S$GLB,, | Performed by: OBSTETRICS & GYNECOLOGY

## 2023-11-28 PROCEDURE — 3008F PR BODY MASS INDEX (BMI) DOCUMENTED: ICD-10-PCS | Mod: CPTII,S$GLB,, | Performed by: OBSTETRICS & GYNECOLOGY

## 2023-11-28 PROCEDURE — 1160F PR REVIEW ALL MEDS BY PRESCRIBER/CLIN PHARMACIST DOCUMENTED: ICD-10-PCS | Mod: CPTII,S$GLB,, | Performed by: OBSTETRICS & GYNECOLOGY

## 2023-11-28 PROCEDURE — 3074F PR MOST RECENT SYSTOLIC BLOOD PRESSURE < 130 MM HG: ICD-10-PCS | Mod: CPTII,S$GLB,, | Performed by: OBSTETRICS & GYNECOLOGY

## 2023-11-28 PROCEDURE — 76816 OB US FOLLOW-UP PER FETUS: CPT | Mod: S$GLB,,, | Performed by: OBSTETRICS & GYNECOLOGY

## 2023-11-28 PROCEDURE — 3044F PR MOST RECENT HEMOGLOBIN A1C LEVEL <7.0%: ICD-10-PCS | Mod: CPTII,S$GLB,, | Performed by: OBSTETRICS & GYNECOLOGY

## 2023-11-28 PROCEDURE — 99214 PR OFFICE/OUTPT VISIT, EST, LEVL IV, 30-39 MIN: ICD-10-PCS | Mod: S$GLB,,, | Performed by: OBSTETRICS & GYNECOLOGY

## 2023-11-28 PROCEDURE — 1159F PR MEDICATION LIST DOCUMENTED IN MEDICAL RECORD: ICD-10-PCS | Mod: CPTII,S$GLB,, | Performed by: OBSTETRICS & GYNECOLOGY

## 2023-11-28 PROCEDURE — 3074F SYST BP LT 130 MM HG: CPT | Mod: CPTII,S$GLB,, | Performed by: OBSTETRICS & GYNECOLOGY

## 2023-11-28 RX ORDER — METFORMIN HYDROCHLORIDE 1000 MG/1
1000 TABLET ORAL 2 TIMES DAILY
Qty: 60 TABLET | Refills: 4 | Status: SHIPPED | OUTPATIENT
Start: 2023-11-28

## 2023-11-28 RX ORDER — INSULIN DETEMIR 100 [IU]/ML
INJECTION, SOLUTION SUBCUTANEOUS
Qty: 6 ML | Refills: 3 | Status: SHIPPED | OUTPATIENT
Start: 2023-11-28

## 2023-11-28 NOTE — PROGRESS NOTES
"Maternal Fetal Medicine follow up consult    SUBJECTIVE:     Rosemarie Good is a 33 y.o.  female with IUP at 24w3d who is seen in follow up consultation by MFM.  Pregnancy complications include:   Problem   1. Diabetes affecting pregnancy in second trimester   2. Thyroid nodule   3. Palpitations   4. Fibromyalgia     Rosemarie presents for routine follow up appointment today. She reports eating "bad" over the  break and therefore was not checking her sugars. She states she started following the diet and checking her sugars again yesterday. She will submit her log weekly for review.  Denies LOF, VB, contractions. Positive fetal movement.    Previous notes reviewed.   No changes to medical, surgical, family, social, or obstetric history.    Interval history since last MFM visit: see above    Medications:  Current Outpatient Medications   Medication Instructions    aspirin (ECOTRIN) 81 mg, Oral, Daily    blood sugar diagnostic Strp To check BG 4 times daily, to use with insurance preferred meter    DULoxetine (CYMBALTA) 20 MG capsule Oral, 2 times daily    fluticasone propionate (FLONASE) 50 mcg/actuation nasal spray 2 sprays, Daily    lancets Misc To check BG 4 times daily, to use with insurance preferred meter    LEVEMIR FLEXPEN 10 Units, Subcutaneous, Nightly    metFORMIN (GLUCOPHAGE) 1,000 mg, Oral, 2 times daily with meals    montelukast (SINGULAIR) 10 mg tablet No dose, route, or frequency recorded.    omeprazole (PRILOSEC) 40 MG capsule No dose, route, or frequency recorded.    ondansetron (ZOFRAN) 8 mg, Oral, Every 8 hours (non-standard times)    pen needle, diabetic (PEN NEEDLE) 31 gauge x 5/16" Ndle 1 application , Misc.(Non-Drug; Combo Route), Nightly, Use pen needle to inject insulin every night at bedtime    prenatal 25/iron fum/folic/dha (PRENATAL-1 ORAL) No dose, route, or frequency recorded.    TRUE METRIX GLUCOSE METER Misc No dose, route, or frequency recorded.       Care team " "members:  Dr Hale - Primary OB     OBJECTIVE:   /70   Pulse 88   Wt 87.7 kg (193 lb 5.5 oz)   LMP  (LMP Unknown)   BMI 33.19 kg/m²     Ultrasound performed. See viewpoint for full ultrasound report.    A viable churchill pregnancy is visualized in transverse presentation.  Estimated fetal weight is at the 44th percentile with an abdominal circumference at the 52nd percentile.    No fetal abnormalities are noted and anatomic survey is complete. Amniotic fluid volume is normal.  Placenta is posterior. Biophysical profile is 8/8. Two small fibroids are present anteriorly and remain stable as noted above.    ASSESSMENT/PLAN:     33 y.o.  female with IUP at 24w3d    1. Diabetes affecting pregnancy in second trimester  She has a history of insulin resistance and PCOS. She was taking Ozempic and Janumet prior to pregnancy. She discontinued Ozempic when she was trying to conceive. She was switched from Janumet to Metformin 1000mg BID at approx 10 weeks EGA.     We have reviewed the risks of diabetes in pregnancy. These risks include but are not limited to an increased risk of , preeclampsia/gestational hypertension, fetal structural anomalies, macrosomia, prematurity, shoulder dystocia/birth injury,  hyperbilirubinemia and electrolyte issues, pulmonary immaturity and sudden stillbirth especially in those patients with poor glucose control. I also discussed with the patient the need for increased fetal surveillance with serial fetal growth assessments and third trimester fetal testing. I also reviewed the possibility of need for early delivery in those with poor glucose control or evidence of fetal growth abnormalities.    She is currently taking Metformin 1000mg BID, Levemir 10u QAM and 14u QHS. She admits she has not been checking her sugar values over the past week (Thanksgiving break) because she has been eating "bad". She states she started following the diet and checking her sugars " again yesterday. She will submit her log weekly for review.    Recommendations:  Baseline evaluation (to be ordered by primary OB provider):  24 hour urine protein or urine P/C ratio, CBC, CMP (completed)  Maternal EKG; echocardiogram if BMI > 30 or EKG is abnormal  Maternal ophthalmic exam (if hasn't been performed in last year) and podiatry exam  Hemoglobin A1C every trimester (completed 1st trim)  Diabetic education referral and counseling re: goal blood sugars (< 95 mg/dl for fasting, < 120 mg/dl for 2 hour postprandial)  Medications:   Start low dose aspirin 81 mg daily at 12-16 weeks for preeclampsia risk reduction  1 mg folic acid daily  Regimen: Metformin 1000mg BID; Levemir 10u QAM/14u QHS  She will submit her log to our office on a weekly basis via email or portal for review and management   Ultrasounds with MFM:  Targeted anatomy survey at 20 weeks (started today, some views limited)  Serial growth ultrasounds q 4-6 weeks at 26-28 weeks (scheduled w/ M)    A fetal echocardiogram is recommended at 22-24 weeks with pediatric cardiology (completed)    Initiate  surveillance at 32 weeks:   Weekly BPP or NST + AFV if good control.   If control is poor, twice weekly  fetal surveillance is recommended with BPP alternating with NST.   Delivery timin 0/7 to 38 and 6/7 weeks if under good control without comorbidities  37 0/7 to 37 and 67 weeks if longstanding diabetes or poorly controlled, polyhydramnios, EFW>90th percentile, or BMI >= 40  36 0/7 to 37 and 6/7 weeks if vascular complications, prior stillbirth or other complicating conditions.  Recommend consideration of earlier delivery if IUGR, HTN, or other complications  Recommend offering  for delivery is EFW is 4500g or more near the time of delivery    Insulin management during labor and delivery (if needed)  -IF AM ADMIT: Give usual dose of intermediate acting (NPH) or long-acting insulin at bedtime the night before  admit  -Hold morning dose of insulin or reduce to ½ dose   -Patients who require insulin should be scheduled as the first available (7 am or 7:30 am)  given the need for NPO status  -Check glucose every 2 hours in latent labor; hourly in active labor  -If blood glucose is less than 70 mg/dl, change IVF to D5 ½ NS at rate of 100-150 cc/hr and monitor blood glucose hourly   -IV infusion of regular insulin is recommended if glucose levels exceed 120 mg/dl  -Patients who are well-controlled with an insulin pump can continue during labor and delivery. Recommend primary OB ensure pump site is out of operative field and confirm pump is compatible with and able to be left on during surgery.    Postpartum management  -Insulin should be reduced by 1/2 of the pre-delivery dose within the first 6-24 hours following delivery.  -Patients who were well-controlled on oral regimens can often return to these following delivery.  -Patients who are breastfeeding should be advised to have small snacks before breastfeeding to reduce the risk of hypoglycemia.  -Patients should be referred to primary MD or Endocrinology for postpartum management.    The patient was advised to call for blood sugars less than 70 or greater than 200 prior to her next appointment. She was also advised that if she notes nausea/vomiting, inability to tolerate PO, or concerns about being able to take her insulin that she should contact her provider or present to the OB ED.        2. Thyroid nodule  She reports a history of thyroid nodules.  She reports having a thyroid ultrasound performed in the past and denies FNA. She also denies abnormal TFTs or need for medication. She gets her thyroid labs checked annually with her PCP and it was last evaluated in January of this year.  TFTs were reassuring.    3. Palpitations  She reports a history of heart palpitations and slightly elevated pulse.  She states she had a complete cardiac workup at [facility].  She states  the only thing that was noted was a slightly elevated pulse.  She has never needed medication for palpitations or tachycardia.  Since becoming pregnant she reports infrequent episodes.  We have discussed medication management options and/or repeat cardiac evaluation should these episodes become frequent.  She states she is doing well without medications at this time.    11/28/23- HR 88    4. Fibromyalgia  She has a history of fibromyalgia and was taking multiple medications prior to pregnancy. She follows with neuro and rheumatology, per her report. She discontinued Orphenadrine and Cyclobenzaprine at positive UPT.  She has weaned off the savella (SNRI).  She is still currently taking Duloxetine daily with plans to discontinue 1 month prior to delivery per recommendation of primary OB.    We have discussed Duloxetine in pregnancy.    F/u in 4 weeks for MFM visit/ultrasound    Trinity Marcano MD  Maternal Fetal Medicine

## 2023-11-28 NOTE — ASSESSMENT & PLAN NOTE
She reports a history of heart palpitations and slightly elevated pulse.  She states she had a complete cardiac workup at [facility].  She states the only thing that was noted was a slightly elevated pulse.  She has never needed medication for palpitations or tachycardia.  Since becoming pregnant she reports infrequent episodes.  We have discussed medication management options and/or repeat cardiac evaluation should these episodes become frequent.  She states she is doing well without medications at this time.    11/28/23- HR 88

## 2023-11-28 NOTE — ASSESSMENT & PLAN NOTE
"She has a history of insulin resistance and PCOS. She was taking Ozempic and Janumet prior to pregnancy. She discontinued Ozempic when she was trying to conceive. She was switched from Janumet to Metformin 1000mg BID at approx 10 weeks EGA.     We have reviewed the risks of diabetes in pregnancy. These risks include but are not limited to an increased risk of , preeclampsia/gestational hypertension, fetal structural anomalies, macrosomia, prematurity, shoulder dystocia/birth injury,  hyperbilirubinemia and electrolyte issues, pulmonary immaturity and sudden stillbirth especially in those patients with poor glucose control. I also discussed with the patient the need for increased fetal surveillance with serial fetal growth assessments and third trimester fetal testing. I also reviewed the possibility of need for early delivery in those with poor glucose control or evidence of fetal growth abnormalities.    She is currently taking Metformin 1000mg BID, Levemir 10u QAM and 14u QHS. She admits she has not been checking her sugar values over the past week ( break) because she has been eating "bad". She states she started following the diet and checking her sugars again yesterday. She will submit her log weekly for review.    Recommendations:  Baseline evaluation (to be ordered by primary OB provider):  24 hour urine protein or urine P/C ratio, CBC, CMP (completed)  Maternal EKG; echocardiogram if BMI > 30 or EKG is abnormal  Maternal ophthalmic exam (if hasn't been performed in last year) and podiatry exam  Hemoglobin A1C every trimester (completed 1st trim)  Diabetic education referral and counseling re: goal blood sugars (< 95 mg/dl for fasting, < 120 mg/dl for 2 hour postprandial)  Medications:   Start low dose aspirin 81 mg daily at 12-16 weeks for preeclampsia risk reduction  1 mg folic acid daily  Regimen: Metformin 1000mg BID; Levemir 10u QAM/14u QHS  She will submit her log to our " office on a weekly basis via email or portal for review and management   Ultrasounds with MFM:  Targeted anatomy survey at 20 weeks (started today, some views limited)  Serial growth ultrasounds q 4-6 weeks at 26-28 weeks (scheduled w/ M)    A fetal echocardiogram is recommended at 22-24 weeks with pediatric cardiology (completed)    Initiate  surveillance at 32 weeks:   Weekly BPP or NST + AFV if good control.   If control is poor, twice weekly  fetal surveillance is recommended with BPP alternating with NST.   Delivery timin 0/7 to 38 and 6/7 weeks if under good control without comorbidities  37 0/7 to 37 and 67 weeks if longstanding diabetes or poorly controlled, polyhydramnios, EFW>90th percentile, or BMI >= 40  36 0/7 to 37 and 6/7 weeks if vascular complications, prior stillbirth or other complicating conditions.  Recommend consideration of earlier delivery if IUGR, HTN, or other complications  Recommend offering  for delivery is EFW is 4500g or more near the time of delivery    Insulin management during labor and delivery (if needed)  -IF AM ADMIT: Give usual dose of intermediate acting (NPH) or long-acting insulin at bedtime the night before admit  -Hold morning dose of insulin or reduce to ½ dose   -Patients who require insulin should be scheduled as the first available (7 am or 7:30 am)  given the need for NPO status  -Check glucose every 2 hours in latent labor; hourly in active labor  -If blood glucose is less than 70 mg/dl, change IVF to D5 ½ NS at rate of 100-150 cc/hr and monitor blood glucose hourly   -IV infusion of regular insulin is recommended if glucose levels exceed 120 mg/dl  -Patients who are well-controlled with an insulin pump can continue during labor and delivery. Recommend primary OB ensure pump site is out of operative field and confirm pump is compatible with and able to be left on during surgery.    Postpartum management  -Insulin should be  reduced by 1/2 of the pre-delivery dose within the first 6-24 hours following delivery.  -Patients who were well-controlled on oral regimens can often return to these following delivery.  -Patients who are breastfeeding should be advised to have small snacks before breastfeeding to reduce the risk of hypoglycemia.  -Patients should be referred to primary MD or Endocrinology for postpartum management.    The patient was advised to call for blood sugars less than 70 or greater than 200 prior to her next appointment. She was also advised that if she notes nausea/vomiting, inability to tolerate PO, or concerns about being able to take her insulin that she should contact her provider or present to the OB ED.

## 2023-12-27 DIAGNOSIS — E04.1 THYROID NODULE: ICD-10-CM

## 2023-12-27 DIAGNOSIS — E88.819 PREGNANCY COMPLICATED BY INSULIN RESISTANCE: ICD-10-CM

## 2023-12-27 DIAGNOSIS — O26.899 PREGNANCY COMPLICATED BY INSULIN RESISTANCE: ICD-10-CM

## 2023-12-27 DIAGNOSIS — M79.7 FIBROMYALGIA: Primary | ICD-10-CM

## 2023-12-27 DIAGNOSIS — R00.2 PALPITATIONS: ICD-10-CM

## 2023-12-28 ENCOUNTER — PROCEDURE VISIT (OUTPATIENT)
Dept: MATERNAL FETAL MEDICINE | Facility: CLINIC | Age: 33
End: 2023-12-28
Payer: COMMERCIAL

## 2023-12-28 ENCOUNTER — OFFICE VISIT (OUTPATIENT)
Dept: MATERNAL FETAL MEDICINE | Facility: CLINIC | Age: 33
End: 2023-12-28
Payer: COMMERCIAL

## 2023-12-28 VITALS
SYSTOLIC BLOOD PRESSURE: 117 MMHG | WEIGHT: 198.88 LBS | DIASTOLIC BLOOD PRESSURE: 74 MMHG | HEART RATE: 87 BPM | BODY MASS INDEX: 34.13 KG/M2

## 2023-12-28 DIAGNOSIS — O24.913 DIABETES MELLITUS AFFECTING PREGNANCY IN THIRD TRIMESTER: Primary | ICD-10-CM

## 2023-12-28 DIAGNOSIS — E88.819 PREGNANCY COMPLICATED BY INSULIN RESISTANCE: ICD-10-CM

## 2023-12-28 DIAGNOSIS — E04.1 THYROID NODULE: ICD-10-CM

## 2023-12-28 DIAGNOSIS — R00.2 PALPITATIONS: ICD-10-CM

## 2023-12-28 DIAGNOSIS — O26.899 PREGNANCY COMPLICATED BY INSULIN RESISTANCE: ICD-10-CM

## 2023-12-28 DIAGNOSIS — M79.7 FIBROMYALGIA: ICD-10-CM

## 2023-12-28 PROCEDURE — 1159F PR MEDICATION LIST DOCUMENTED IN MEDICAL RECORD: ICD-10-PCS | Mod: CPTII,S$GLB,, | Performed by: OBSTETRICS & GYNECOLOGY

## 2023-12-28 PROCEDURE — 3074F SYST BP LT 130 MM HG: CPT | Mod: CPTII,S$GLB,, | Performed by: OBSTETRICS & GYNECOLOGY

## 2023-12-28 PROCEDURE — 3044F PR MOST RECENT HEMOGLOBIN A1C LEVEL <7.0%: ICD-10-PCS | Mod: CPTII,S$GLB,, | Performed by: OBSTETRICS & GYNECOLOGY

## 2023-12-28 PROCEDURE — 99214 PR OFFICE/OUTPT VISIT, EST, LEVL IV, 30-39 MIN: ICD-10-PCS | Mod: S$GLB,,, | Performed by: OBSTETRICS & GYNECOLOGY

## 2023-12-28 PROCEDURE — 99214 OFFICE O/P EST MOD 30 MIN: CPT | Mod: S$GLB,,, | Performed by: OBSTETRICS & GYNECOLOGY

## 2023-12-28 PROCEDURE — 3074F PR MOST RECENT SYSTOLIC BLOOD PRESSURE < 130 MM HG: ICD-10-PCS | Mod: CPTII,S$GLB,, | Performed by: OBSTETRICS & GYNECOLOGY

## 2023-12-28 PROCEDURE — 3078F PR MOST RECENT DIASTOLIC BLOOD PRESSURE < 80 MM HG: ICD-10-PCS | Mod: CPTII,S$GLB,, | Performed by: OBSTETRICS & GYNECOLOGY

## 2023-12-28 PROCEDURE — 76816 OB US FOLLOW-UP PER FETUS: CPT | Mod: S$GLB,,, | Performed by: OBSTETRICS & GYNECOLOGY

## 2023-12-28 PROCEDURE — 3044F HG A1C LEVEL LT 7.0%: CPT | Mod: CPTII,S$GLB,, | Performed by: OBSTETRICS & GYNECOLOGY

## 2023-12-28 PROCEDURE — 3078F DIAST BP <80 MM HG: CPT | Mod: CPTII,S$GLB,, | Performed by: OBSTETRICS & GYNECOLOGY

## 2023-12-28 PROCEDURE — 3008F BODY MASS INDEX DOCD: CPT | Mod: CPTII,S$GLB,, | Performed by: OBSTETRICS & GYNECOLOGY

## 2023-12-28 PROCEDURE — 1159F MED LIST DOCD IN RCRD: CPT | Mod: CPTII,S$GLB,, | Performed by: OBSTETRICS & GYNECOLOGY

## 2023-12-28 PROCEDURE — 76816 PR  US,PREGNANT UTERUS,F/U,TRANSABD APP: ICD-10-PCS | Mod: S$GLB,,, | Performed by: OBSTETRICS & GYNECOLOGY

## 2023-12-28 PROCEDURE — 3008F PR BODY MASS INDEX (BMI) DOCUMENTED: ICD-10-PCS | Mod: CPTII,S$GLB,, | Performed by: OBSTETRICS & GYNECOLOGY

## 2023-12-28 RX ORDER — FERRIC MALTOL 30 MG/1
1 CAPSULE ORAL 2 TIMES DAILY
COMMUNITY
Start: 2023-12-05

## 2023-12-28 NOTE — ASSESSMENT & PLAN NOTE
She has a history of insulin resistance and PCOS. She was taking Ozempic and Janumet prior to pregnancy. She discontinued Ozempic when she was trying to conceive. She was switched from Janumet to Metformin 1000mg BID at approx 10 weeks EGA.     We have reviewed the risks of diabetes in pregnancy. These risks include but are not limited to an increased risk of , preeclampsia/gestational hypertension, fetal structural anomalies, macrosomia, prematurity, shoulder dystocia/birth injury,  hyperbilirubinemia and electrolyte issues, pulmonary immaturity and sudden stillbirth especially in those patients with poor glucose control. I also discussed with the patient the need for increased fetal surveillance with serial fetal growth assessments and third trimester fetal testing. I also reviewed the possibility of need for early delivery in those with poor glucose control or evidence of fetal growth abnormalities.    She is currently taking Metformin 1000mg BID, Levemir 10u QAM and 14u QHS. She checks her values inconsistently despite multiple requests at checks four times daily. She states she is unable to check her values four times daily due to the nature of her work. We have offered to write an excuse for her work, however, she has politely declined. She understands glycemic control is correlated to pregnancy outcome.     Recommendations:  Baseline evaluation (to be ordered by primary OB provider):  24 hour urine protein or urine P/C ratio, CBC, CMP (completed)  Maternal EKG; echocardiogram if BMI > 30 or EKG is abnormal  Maternal ophthalmic exam (if hasn't been performed in last year) and podiatry exam  Hemoglobin A1C every trimester (completed 1st trim)  Diabetic education referral and counseling re: goal blood sugars (< 95 mg/dl for fasting, < 120 mg/dl for 2 hour postprandial)  Medications:   Start low dose aspirin 81 mg daily at 12-16 weeks for preeclampsia risk reduction  1 mg folic acid daily  Regimen:  Metformin 1000mg BID; Levemir 10u QAM/14u QHS  She will submit her log to our office on a weekly basis via email or portal for review and management   Ultrasounds with MFM:  Targeted anatomy survey at 20 weeks (started today, some views limited)  Serial growth ultrasounds q 4-6 weeks at 26-28 weeks (scheduled w/ MFM)    A fetal echocardiogram is recommended at 22-24 weeks with pediatric cardiology (completed)    Initiate  surveillance at 32 weeks:   Weekly BPP or NST + AFV if good control.   If control is poor, twice weekly  fetal surveillance is recommended with BPP alternating with NST.   Delivery timin 0/7 to 38 and 6/7 weeks if under good control without comorbidities  37 0/7 to 37 and 67 weeks if longstanding diabetes or poorly controlled, polyhydramnios, EFW>90th percentile, or BMI >= 40  36 0/7 to 37 and 6/7 weeks if vascular complications, prior stillbirth or other complicating conditions.  Recommend consideration of earlier delivery if IUGR, HTN, or other complications  Recommend offering  for delivery is EFW is 4500g or more near the time of delivery    Insulin management during labor and delivery (if needed)  -IF AM ADMIT: Give usual dose of intermediate acting (NPH) or long-acting insulin at bedtime the night before admit  -Hold morning dose of insulin or reduce to ½ dose   -Patients who require insulin should be scheduled as the first available (7 am or 7:30 am)  given the need for NPO status  -Check glucose every 2 hours in latent labor; hourly in active labor  -If blood glucose is less than 70 mg/dl, change IVF to D5 ½ NS at rate of 100-150 cc/hr and monitor blood glucose hourly   -IV infusion of regular insulin is recommended if glucose levels exceed 120 mg/dl  -Patients who are well-controlled with an insulin pump can continue during labor and delivery. Recommend primary OB ensure pump site is out of operative field and confirm pump is compatible with and  able to be left on during surgery.    Postpartum management  -Insulin should be reduced by 1/2 of the pre-delivery dose within the first 6-24 hours following delivery.  -Patients who were well-controlled on oral regimens can often return to these following delivery.  -Patients who are breastfeeding should be advised to have small snacks before breastfeeding to reduce the risk of hypoglycemia.  -Patients should be referred to primary MD or Endocrinology for postpartum management.    The patient was advised to call for blood sugars less than 70 or greater than 200 prior to her next appointment. She was also advised that if she notes nausea/vomiting, inability to tolerate PO, or concerns about being able to take her insulin that she should contact her provider or present to the OB ED.

## 2023-12-28 NOTE — ASSESSMENT & PLAN NOTE
She reports a history of heart palpitations and slightly elevated pulse.  She states she had a complete cardiac workup at [facility].  She states the only thing that was noted was a slightly elevated pulse.  She has never needed medication for palpitations or tachycardia.  Since becoming pregnant she reports infrequent episodes.  We have discussed medication management options and/or repeat cardiac evaluation should these episodes become frequent.  She states she is doing well without medications at this time.    11/28/23- HR 88

## 2023-12-28 NOTE — PROGRESS NOTES
"Maternal Fetal Medicine follow up consult    SUBJECTIVE:     Rosemarie Good is a 33 y.o.  female with IUP at 28w5d who is seen in follow up consultation by MFM.  Pregnancy complications include:   Problem   1. Diabetes mellitus affecting pregnancy in third trimester   2. Thyroid nodule   3. Palpitations   4. Fibromyalgia     Rosemarie presents for routine follow up appointment. She is inconsistently checking her sugars. Reports taking Metformin 1000mg BID, Levemir 10u QAM, and Levemir 14u QHS most days (sometimes forgets). She presents with a log for review.  Denies LOF, VB, contractions. Positive fetal movement.    Previous notes reviewed.   No changes to medical, surgical, family, social, or obstetric history.    Interval history since last [facility] visit: see above    Medications:  Current Outpatient Medications   Medication Instructions    ACCRUFER 30 mg Cap 1 capsule, Oral, 2 times daily    aspirin (ECOTRIN) 81 mg, Oral, Daily    blood sugar diagnostic Strp To check BG 4 times daily, to use with insurance preferred meter    DULoxetine (CYMBALTA) 20 MG capsule Oral, 2 times daily    fluticasone propionate (FLONASE) 50 mcg/actuation nasal spray 2 sprays, Daily    insulin detemir U-100, Levemir, (LEVEMIR FLEXPEN) 100 unit/mL (3 mL) InPn pen Inject 10u with breakfast and 14u at bedtime with diabetic snack    lancets Misc To check BG 4 times daily, to use with insurance preferred meter    metFORMIN (GLUCOPHAGE) 1,000 mg, Oral, 2 times daily    montelukast (SINGULAIR) 10 mg tablet No dose, route, or frequency recorded.    omeprazole (PRILOSEC) 40 MG capsule No dose, route, or frequency recorded.    ondansetron (ZOFRAN) 8 mg, Oral, Every 8 hours (non-standard times)    pen needle, diabetic (PEN NEEDLE) 31 gauge x 5/16" Ndle 1 application , Misc.(Non-Drug; Combo Route), Nightly, Use pen needle to inject insulin every night at bedtime    prenatal 25/iron fum/folic/dha (PRENATAL-1 ORAL) No dose, route, or frequency " recorded.    TRUE METRIX GLUCOSE METER Misc No dose, route, or frequency recorded.       Care team members:  Dr Hale - Primary OB     OBJECTIVE:   /74   Pulse 87   Wt 90.2 kg (198 lb 13.7 oz)   LMP  (LMP Unknown)   BMI 34.13 kg/m²     Ultrasound performed. See viewpoint for full ultrasound report.    A viable churchill pregnancy is visualized in cephalic presentation.  Estimated fetal weight is at the 27th percentile with an abdominal circumference at the 3rd percentile (Isolated AC lag).    No fetal abnormalities are noted and previous anatomic survey was normal. Amniotic fluid volume is normal.  Placenta is posterior. 2 small anterior fibroids remain stable in size.    ASSESSMENT/PLAN:     33 y.o.  female with IUP at 28w5d    1. Diabetes mellitus affecting pregnancy in third trimester  She has a history of insulin resistance and PCOS. She was taking Ozempic and Janumet prior to pregnancy. She discontinued Ozempic when she was trying to conceive. She was switched from Janumet to Metformin 1000mg BID at approx 10 weeks EGA.     We have reviewed the risks of diabetes in pregnancy. These risks include but are not limited to an increased risk of , preeclampsia/gestational hypertension, fetal structural anomalies, macrosomia, prematurity, shoulder dystocia/birth injury,  hyperbilirubinemia and electrolyte issues, pulmonary immaturity and sudden stillbirth especially in those patients with poor glucose control. I also discussed with the patient the need for increased fetal surveillance with serial fetal growth assessments and third trimester fetal testing. I also reviewed the possibility of need for early delivery in those with poor glucose control or evidence of fetal growth abnormalities.    She is currently taking Metformin 1000mg BID, Levemir 10u QAM and 14u QHS. She checks her values inconsistently despite multiple requests at checks four times daily. She states she is unable to  check her values four times daily due to the nature of her work. We have offered to write an excuse for her work, however, she has politely declined. She understands glycemic control is correlated to pregnancy outcome.     Recommendations:  Baseline evaluation (to be ordered by primary OB provider):  24 hour urine protein or urine P/C ratio, CBC, CMP (completed)  Maternal EKG; echocardiogram if BMI > 30 or EKG is abnormal  Maternal ophthalmic exam (if hasn't been performed in last year) and podiatry exam  Hemoglobin A1C every trimester (completed 1st trim)  Diabetic education referral and counseling re: goal blood sugars (< 95 mg/dl for fasting, < 120 mg/dl for 2 hour postprandial)  Medications:   Start low dose aspirin 81 mg daily at 12-16 weeks for preeclampsia risk reduction  1 mg folic acid daily  Regimen: Metformin 1000mg BID; Levemir 10u QAM/14u QHS  She will submit her log to our office on a weekly basis via email or portal for review and management   Ultrasounds with MFM:  Targeted anatomy survey at 20 weeks (started today, some views limited)  Serial growth ultrasounds q 4-6 weeks at 26-28 weeks (scheduled w/ MFM)    A fetal echocardiogram is recommended at 22-24 weeks with pediatric cardiology (completed)    Initiate  surveillance at 32 weeks:   Weekly BPP or NST + AFV if good control.   If control is poor, twice weekly  fetal surveillance is recommended with BPP alternating with NST.   Delivery timin 0/7 to 38 and 6/7 weeks if under good control without comorbidities  37 0/7 to 37 and 67 weeks if longstanding diabetes or poorly controlled, polyhydramnios, EFW>90th percentile, or BMI >= 40  36 0/7 to 37 and 6/7 weeks if vascular complications, prior stillbirth or other complicating conditions.  Recommend consideration of earlier delivery if IUGR, HTN, or other complications  Recommend offering  for delivery is EFW is 4500g or more near the time of delivery    Insulin  management during labor and delivery (if needed)  -IF AM ADMIT: Give usual dose of intermediate acting (NPH) or long-acting insulin at bedtime the night before admit  -Hold morning dose of insulin or reduce to ½ dose   -Patients who require insulin should be scheduled as the first available (7 am or 7:30 am)  given the need for NPO status  -Check glucose every 2 hours in latent labor; hourly in active labor  -If blood glucose is less than 70 mg/dl, change IVF to D5 ½ NS at rate of 100-150 cc/hr and monitor blood glucose hourly   -IV infusion of regular insulin is recommended if glucose levels exceed 120 mg/dl  -Patients who are well-controlled with an insulin pump can continue during labor and delivery. Recommend primary OB ensure pump site is out of operative field and confirm pump is compatible with and able to be left on during surgery.    Postpartum management  -Insulin should be reduced by 1/2 of the pre-delivery dose within the first 6-24 hours following delivery.  -Patients who were well-controlled on oral regimens can often return to these following delivery.  -Patients who are breastfeeding should be advised to have small snacks before breastfeeding to reduce the risk of hypoglycemia.  -Patients should be referred to primary MD or Endocrinology for postpartum management.    The patient was advised to call for blood sugars less than 70 or greater than 200 prior to her next appointment. She was also advised that if she notes nausea/vomiting, inability to tolerate PO, or concerns about being able to take her insulin that she should contact her provider or present to the OB ED.        2. Thyroid nodule  She reports a history of thyroid nodules.  She reports having a thyroid ultrasound performed in the past and denies FNA. She also denies abnormal TFTs or need for medication. She gets her thyroid labs checked annually with her PCP and it was last evaluated in January of this year.  TFTs were  reassuring.    3. Palpitations  She reports a history of heart palpitations and slightly elevated pulse.  She states she had a complete cardiac workup at [facility].  She states the only thing that was noted was a slightly elevated pulse.  She has never needed medication for palpitations or tachycardia.  Since becoming pregnant she reports infrequent episodes.  We have discussed medication management options and/or repeat cardiac evaluation should these episodes become frequent.  She states she is doing well without medications at this time.    11/28/23- HR 88    4. Fibromyalgia  She has a history of fibromyalgia and was taking multiple medications prior to pregnancy. She follows with neuro and rheumatology, per her report. She discontinued Orphenadrine and Cyclobenzaprine at positive UPT.  She has weaned off the savella (SNRI).  She is still currently taking Duloxetine daily with plans to discontinue 1 month prior to delivery per recommendation of primary OB.    We have discussed Duloxetine in pregnancy.    F/u in 3 weeks for MFM visit/growth ultrasound (isolated AC lag)    Trinity Marcano MD  Maternal Fetal Medicine

## 2024-01-07 ENCOUNTER — HOSPITAL ENCOUNTER (OUTPATIENT)
Facility: HOSPITAL | Age: 34
Discharge: HOME | End: 2024-01-07
Attending: OBSTETRICS & GYNECOLOGY | Admitting: OBSTETRICS & GYNECOLOGY
Payer: OTHER MISCELLANEOUS

## 2024-01-07 VITALS
RESPIRATION RATE: 18 BRPM | OXYGEN SATURATION: 99 % | TEMPERATURE: 99 F | SYSTOLIC BLOOD PRESSURE: 129 MMHG | WEIGHT: 198.88 LBS | HEIGHT: 64 IN | BODY MASS INDEX: 33.95 KG/M2 | HEART RATE: 83 BPM | DIASTOLIC BLOOD PRESSURE: 89 MMHG

## 2024-01-07 DIAGNOSIS — S39.91XA BLUNT ABDOMINAL TRAUMA, INITIAL ENCOUNTER: ICD-10-CM

## 2024-01-07 LAB — POCT GLUCOSE: 115 MG/DL (ref 70–110)

## 2024-01-07 PROCEDURE — 59025 FETAL NON-STRESS TEST: CPT

## 2024-01-07 PROCEDURE — 99214 OFFICE O/P EST MOD 30 MIN: CPT | Mod: 25,,, | Performed by: ADVANCED PRACTICE MIDWIFE

## 2024-01-07 PROCEDURE — 99211 OFF/OP EST MAY X REQ PHY/QHP: CPT | Mod: 25

## 2024-01-07 PROCEDURE — 59025 FETAL NON-STRESS TEST: CPT | Mod: 26,,, | Performed by: ADVANCED PRACTICE MIDWIFE

## 2024-01-07 RX ORDER — ONDANSETRON 8 MG/1
8 TABLET, ORALLY DISINTEGRATING ORAL EVERY 8 HOURS PRN
Status: DISCONTINUED | OUTPATIENT
Start: 2024-01-07 | End: 2024-01-07 | Stop reason: HOSPADM

## 2024-01-07 RX ORDER — PROCHLORPERAZINE EDISYLATE 5 MG/ML
5 INJECTION INTRAMUSCULAR; INTRAVENOUS EVERY 6 HOURS PRN
Status: DISCONTINUED | OUTPATIENT
Start: 2024-01-07 | End: 2024-01-07 | Stop reason: HOSPADM

## 2024-01-07 RX ORDER — ACETAMINOPHEN 500 MG
500 TABLET ORAL EVERY 6 HOURS PRN
Status: DISCONTINUED | OUTPATIENT
Start: 2024-01-07 | End: 2024-01-07 | Stop reason: HOSPADM

## 2024-01-07 NOTE — ASSESSMENT & PLAN NOTE
NST reactive and reassuring  Observation 6 hours reassuring, no contractions  US reassuring with BPP 8/8 and no evidence of placental abruption  Patient reports blood type: O positive  Will d/c home, f/u with OB next week.
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
## 2024-01-07 NOTE — PROCEDURES
"Rosemarie Good is a 33 y.o. female patient.    Temp: 99.1 °F (37.3 °C) (24)  Pulse: 102 (24 1408)  Resp: 18 (24)  BP: 120/79 (24 1408)  SpO2: 98 % (24 1408)  Weight: 90.2 kg (198 lb 13.7 oz) (24)  Height: 5' 4" (162.6 cm) (24)       Obtain Fetal nonstress test (NST)    Date/Time: 2024 5:52 PM    Performed by: Carli Westfall CNM  Authorized by: Carli Westfall CNM    Nonstress Test:     Variability:  6-25 BPM    Decelerations:  None    Accelerations:  15 bpm    Baseline:  140    Uterine Irritability: No      Contractions:  Not present  Biophysical Profile:     Fetal Breathin    Fetal Movement:  2    Fetal Tone:  2    Fluid Volume:  2    Nonstress Test:  2    Biophysical Profile Score  (of 8):  8    Biophysical Profile Score  (of 10):  10    GOLDY (if indicated) (cm):  13    Nonstress Test Interpretation: reactive      Overall Impression:  Reassuring  Post-procedure:     Patient tolerance:  Patient tolerated the procedure well with no immediate complications      2024    "
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
## 2024-01-07 NOTE — SUBJECTIVE & OBJECTIVE
"Obstetric HPI:  This pregnancy has been complicated by   Obesity  Insulin resistance  Diabetes  Thyroid nodules  palpitations    OB History    Para Term  AB Living   1 0 0 0 0 0   SAB IAB Ectopic Multiple Live Births   0 0 0 0 0      # Outcome Date GA Lbr Abdias/2nd Weight Sex Delivery Anes PTL Lv   1 Current              Past Medical History:   Diagnosis Date    Acid reflux     Allergy     Dyspareunia     Fibromyalgia     Frequent headaches     Insulin resistance     Migraines     PCOS (polycystic ovarian syndrome)     Sinus problem     TMJ (dislocation of temporomandibular joint)      Past Surgical History:   Procedure Laterality Date    BIOPSY OF ADENOIDS      CHOLECYSTECTOMY      TONSILLECTOMY      WISDOM TOOTH EXTRACTION Bilateral        PTA Medications   Medication Sig    ACCRUFER 30 mg Cap Take 1 capsule by mouth 2 (two) times daily.    aspirin (ECOTRIN) 81 MG EC tablet Take 1 tablet (81 mg total) by mouth once daily.    DULoxetine (CYMBALTA) 20 MG capsule Take by mouth 2 (two) times daily.    insulin detemir U-100, Levemir, (LEVEMIR FLEXPEN) 100 unit/mL (3 mL) InPn pen Inject 10u with breakfast and 14u at bedtime with diabetic snack    metFORMIN (GLUCOPHAGE) 1000 MG tablet Take 1 tablet (1,000 mg total) by mouth 2 (two) times a day.    montelukast (SINGULAIR) 10 mg tablet     omeprazole (PRILOSEC) 40 MG capsule     prenatal 25/iron fum/folic/dha (PRENATAL-1 ORAL)     blood sugar diagnostic Strp To check BG 4 times daily, to use with insurance preferred meter    fluticasone propionate (FLONASE) 50 mcg/actuation nasal spray 2 sprays once daily.    [facility] To check BG 4 times daily, to use with insurance preferred meter    ondansetron (ZOFRAN) 8 MG tablet Take 8 mg by mouth every 8 (eight) hours.    pen needle, diabetic (PEN NEEDLE) 31 gauge x 5/16" Ndle 1 application  by Misc.(Non-Drug; Combo Route) route nightly. Use pen needle to inject insulin every night at bedtime    TRUE METRIX GLUCOSE " METER Misc        Review of patient's allergies indicates:  No Known Allergies     Family History       Problem Relation (Age of Onset)    Asthma Father    Diabetes Mother    Hyperlipidemia Mother, Father    Hypertension Mother          Tobacco Use    Smoking status: Never    Smokeless tobacco: Never   Substance and Sexual Activity    Alcohol use: Not Currently    Drug use: Never    Sexual activity: Yes     Partners: Male     Birth control/protection: None     Review of Systems   Gastrointestinal:  Negative for abdominal pain.   Genitourinary:  Negative for vaginal bleeding and vaginal discharge.   All other systems reviewed and are negative.     Objective:     Vital Signs (Most Recent):  Temp: 99.1 °F (37.3 °C) (01/07/24 1339)  Pulse: 102 (01/07/24 1408)  Resp: 18 (01/07/24 1339)  BP: 120/79 (01/07/24 1408)  SpO2: 98 % (01/07/24 1408) Vital Signs (24h Range):  Temp:  [99.1 °F (37.3 °C)] 99.1 °F (37.3 °C)  Pulse:  [] 102  Resp:  [18] 18  SpO2:  [97 %-98 %] 98 %  BP: (120-127)/(79-88) 120/79     Weight: 90.2 kg (198 lb 13.7 oz)  Body mass index is 34.13 kg/m².    FHT: 140 Cat 1 (reassuring)  TOCO:  none     Physical Exam:   Constitutional: She is oriented to person, place, and time. Vital signs are normal. She appears well-developed and well-nourished. She is cooperative.    HENT:   Head: Normocephalic.      Cardiovascular:  Normal rate, regular rhythm and normal heart sounds.             Pulmonary/Chest: Effort normal.        Abdominal: Soft.   Gravid, non-tender             Musculoskeletal: Normal range of motion and moves all extremeties.       Neurological: She is alert and oriented to person, place, and time. She has normal strength and normal reflexes.    Skin: Skin is warm and dry. Capillary refill takes less than 2 seconds.    Psychiatric: She has a normal mood and affect. Her speech is normal and behavior is normal. Judgment and thought content normal.        Cervix:  Exam deferred     Significant  "Labs:  No results found for: "GROUPTRH", "HEPBSAG", "RUBELLAIGGSC", "STREPBCULT", "AFP", "[id number]"    I have personallly reviewed all pertinent lab results from the last 24 hours.  Recent Lab Results         01/07/24  1405        POCT Glucose 115             "

## 2024-01-17 DIAGNOSIS — E04.1 THYROID NODULE: ICD-10-CM

## 2024-01-17 DIAGNOSIS — O24.913 DIABETES MELLITUS AFFECTING PREGNANCY IN THIRD TRIMESTER: Primary | ICD-10-CM

## 2024-01-17 DIAGNOSIS — M79.7 FIBROMYALGIA: ICD-10-CM

## 2024-01-17 DIAGNOSIS — R00.2 PALPITATIONS: ICD-10-CM

## 2024-01-18 ENCOUNTER — PROCEDURE VISIT (OUTPATIENT)
Dept: MATERNAL FETAL MEDICINE | Facility: CLINIC | Age: 34
End: 2024-01-18
Payer: COMMERCIAL

## 2024-01-18 ENCOUNTER — OFFICE VISIT (OUTPATIENT)
Dept: MATERNAL FETAL MEDICINE | Facility: CLINIC | Age: 34
End: 2024-01-18
Payer: COMMERCIAL

## 2024-01-18 VITALS
DIASTOLIC BLOOD PRESSURE: 84 MMHG | WEIGHT: 203.69 LBS | HEART RATE: 87 BPM | SYSTOLIC BLOOD PRESSURE: 134 MMHG | BODY MASS INDEX: 34.97 KG/M2

## 2024-01-18 DIAGNOSIS — R00.2 PALPITATIONS: ICD-10-CM

## 2024-01-18 DIAGNOSIS — M79.7 FIBROMYALGIA: ICD-10-CM

## 2024-01-18 DIAGNOSIS — E04.1 THYROID NODULE: ICD-10-CM

## 2024-01-18 DIAGNOSIS — O24.913 DIABETES MELLITUS AFFECTING PREGNANCY IN THIRD TRIMESTER: ICD-10-CM

## 2024-01-18 DIAGNOSIS — O24.913 DIABETES MELLITUS AFFECTING PREGNANCY IN THIRD TRIMESTER: Primary | ICD-10-CM

## 2024-01-18 PROCEDURE — 76816 OB US FOLLOW-UP PER FETUS: CPT | Mod: S$GLB,,, | Performed by: OBSTETRICS & GYNECOLOGY

## 2024-01-18 PROCEDURE — 3075F SYST BP GE 130 - 139MM HG: CPT | Mod: CPTII,S$GLB,, | Performed by: OBSTETRICS & GYNECOLOGY

## 2024-01-18 PROCEDURE — 3079F DIAST BP 80-89 MM HG: CPT | Mod: CPTII,S$GLB,, | Performed by: OBSTETRICS & GYNECOLOGY

## 2024-01-18 PROCEDURE — 3008F BODY MASS INDEX DOCD: CPT | Mod: CPTII,S$GLB,, | Performed by: OBSTETRICS & GYNECOLOGY

## 2024-01-18 PROCEDURE — 99213 OFFICE O/P EST LOW 20 MIN: CPT | Mod: S$GLB,,, | Performed by: OBSTETRICS & GYNECOLOGY

## 2024-01-18 PROCEDURE — 1160F RVW MEDS BY RX/DR IN RCRD: CPT | Mod: CPTII,S$GLB,, | Performed by: OBSTETRICS & GYNECOLOGY

## 2024-01-18 PROCEDURE — 1159F MED LIST DOCD IN RCRD: CPT | Mod: CPTII,S$GLB,, | Performed by: OBSTETRICS & GYNECOLOGY

## 2024-01-18 NOTE — ASSESSMENT & PLAN NOTE
She has a history of insulin resistance and PCOS. She was taking Ozempic and Janumet prior to pregnancy. She discontinued Ozempic when she was trying to conceive. She was switched from Janumet to Metformin 1000mg BID at approx 10 weeks EGA.     We have reviewed the risks of diabetes in pregnancy. These risks include but are not limited to an increased risk of , preeclampsia/gestational hypertension, fetal structural anomalies, macrosomia, prematurity, shoulder dystocia/birth injury,  hyperbilirubinemia and electrolyte issues, pulmonary immaturity and sudden stillbirth especially in those patients with poor glucose control. I also discussed with the patient the need for increased fetal surveillance with serial fetal growth assessments and third trimester fetal testing. I also reviewed the possibility of need for early delivery in those with poor glucose control or evidence of fetal growth abnormalities.    She is currently taking Metformin 1000mg BID, Levemir 10u QAM and 14u QHS. She checks her values inconsistently despite multiple requests at checks four times daily. She states she is unable to check her values four times daily due to the nature of her work. We have offered to write an excuse for her work, however, she has politely declined. She understands glycemic control is correlated to pregnancy outcome.     Recommendations:  Baseline evaluation (to be ordered by primary OB provider):  24 hour urine protein or urine P/C ratio, CBC, CMP (completed)  Maternal EKG; echocardiogram if BMI > 30 or EKG is abnormal  Maternal ophthalmic exam (if hasn't been performed in last year) and podiatry exam  Hemoglobin A1C every trimester (completed 1st trim)  Diabetic education referral and counseling re: goal blood sugars (< 95 mg/dl for fasting, < 120 mg/dl for 2 hour postprandial)  Medications:   Start low dose aspirin 81 mg daily at 12-16 weeks for preeclampsia risk reduction  1 mg folic acid daily  Regimen:  Metformin 1000mg BID; Levemir 10u QAM/14u QHS  She will submit her log to our office on a weekly basis via email or portal for review and management   Ultrasounds with MFM:  Targeted anatomy survey at 20 weeks (started today, some views limited)  Serial growth ultrasounds q 4-6 weeks at 26-28 weeks (scheduled w/ MFM)    A fetal echocardiogram is recommended at 22-24 weeks with pediatric cardiology (completed)    Initiate  surveillance at 32 weeks:   Weekly BPP or NST + AFV if good control.   If control is poor, twice weekly  fetal surveillance is recommended with BPP alternating with NST (this is our recommendation due to poor compliance with checks).   Delivery timin 0/7 to 38 and 6/7 weeks if under good control without comorbidities  37 0/7 to 37 and 67 weeks if longstanding diabetes or poorly controlled, polyhydramnios, EFW>90th percentile, or BMI >= 40  36 0/7 to 37 and 6/7 weeks if vascular complications, prior stillbirth or other complicating conditions.  Recommend consideration of earlier delivery if IUGR, HTN, or other complications  Recommend offering  for delivery is EFW is 4500g or more near the time of delivery    Insulin management during labor and delivery (if needed)  -IF AM ADMIT: Give usual dose of intermediate acting (NPH) or long-acting insulin at bedtime the night before admit  -Hold morning dose of insulin or reduce to ½ dose   -Patients who require insulin should be scheduled as the first available (7 am or 7:30 am)  given the need for NPO status  -Check glucose every 2 hours in latent labor; hourly in active labor  -If blood glucose is less than 70 mg/dl, change IVF to D5 ½ NS at rate of 100-150 cc/hr and monitor blood glucose hourly   -IV infusion of regular insulin is recommended if glucose levels exceed 120 mg/dl  -Patients who are well-controlled with an insulin pump can continue during labor and delivery. Recommend primary OB ensure pump site is  out of operative field and confirm pump is compatible with and able to be left on during surgery.    Postpartum management  -Insulin should be reduced by 1/2 of the pre-delivery dose within the first 6-24 hours following delivery.  -Patients who were well-controlled on oral regimens can often return to these following delivery.  -Patients who are breastfeeding should be advised to have small snacks before breastfeeding to reduce the risk of hypoglycemia.  -Patients should be referred to primary MD or Endocrinology for postpartum management.    The patient was advised to call for blood sugars less than 70 or greater than 200 prior to her next appointment. She was also advised that if she notes nausea/vomiting, inability to tolerate PO, or concerns about being able to take her insulin that she should contact her provider or present to the OB ED.

## 2024-01-18 NOTE — ASSESSMENT & PLAN NOTE
She reports a history of heart palpitations and slightly elevated pulse.  She states she had a complete cardiac workup at [facility].  She states the only thing that was noted was a slightly elevated pulse.  She has never needed medication for palpitations or tachycardia.  Since becoming pregnant she reports infrequent episodes.  We have discussed medication management options and/or repeat cardiac evaluation should these episodes become frequent.  She states she is doing well without medications at this time.    11/28/23- HR 88  1/18/24- HR 87
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
## 2024-01-24 ENCOUNTER — PROCEDURE VISIT (OUTPATIENT)
Dept: MATERNAL FETAL MEDICINE | Facility: CLINIC | Age: 34
End: 2024-01-24
Payer: COMMERCIAL

## 2024-01-24 VITALS — HEART RATE: 92 BPM | SYSTOLIC BLOOD PRESSURE: 130 MMHG | DIASTOLIC BLOOD PRESSURE: 85 MMHG

## 2024-01-24 DIAGNOSIS — E04.1 THYROID NODULE: ICD-10-CM

## 2024-01-24 DIAGNOSIS — M79.7 FIBROMYALGIA: ICD-10-CM

## 2024-01-24 DIAGNOSIS — O24.913 DIABETES MELLITUS AFFECTING PREGNANCY IN THIRD TRIMESTER: Primary | ICD-10-CM

## 2024-01-24 DIAGNOSIS — O24.913 DIABETES MELLITUS AFFECTING PREGNANCY IN THIRD TRIMESTER: ICD-10-CM

## 2024-01-24 PROCEDURE — 76819 FETAL BIOPHYS PROFIL W/O NST: CPT | Mod: S$GLB,,, | Performed by: OBSTETRICS & GYNECOLOGY

## 2024-01-30 ENCOUNTER — HOSPITAL ENCOUNTER (INPATIENT)
Facility: HOSPITAL | Age: 34
LOS: 7 days | Discharge: HOME | End: 2024-02-06
Attending: OBSTETRICS & GYNECOLOGY | Admitting: OBSTETRICS & GYNECOLOGY
Payer: COMMERCIAL

## 2024-01-30 DIAGNOSIS — O14.13 SEVERE PREECLAMPSIA, THIRD TRIMESTER: Primary | ICD-10-CM

## 2024-01-30 DIAGNOSIS — Z98.891 STATUS POST CESAREAN DELIVERY: ICD-10-CM

## 2024-01-30 DIAGNOSIS — O13.9 GESTATIONAL HYPERTENSION: ICD-10-CM

## 2024-01-30 LAB
ALBUMIN SERPL-MCNC: 2.5 G/DL (ref 3.5–5)
ALBUMIN/GLOB SERPL: 0.9 RATIO (ref 1.1–2)
ALP SERPL-CCNC: 92 UNIT/L (ref 40–150)
ALT SERPL-CCNC: 8 UNIT/L (ref 0–55)
AST SERPL-CCNC: 13 UNIT/L (ref 5–34)
BASOPHILS # BLD AUTO: 0.02 X10(3)/MCL
BASOPHILS NFR BLD AUTO: 0.2 %
BILIRUB SERPL-MCNC: 0.2 MG/DL
BUN SERPL-MCNC: 7.1 MG/DL (ref 7–18.7)
CALCIUM SERPL-MCNC: 8.3 MG/DL (ref 8.4–10.2)
CHLORIDE SERPL-SCNC: 110 MMOL/L (ref 98–107)
CO2 SERPL-SCNC: 21 MMOL/L (ref 22–29)
CREAT SERPL-MCNC: 0.69 MG/DL (ref 0.55–1.02)
CREAT UR-MCNC: 29.7 MG/DL (ref 45–106)
EOSINOPHIL # BLD AUTO: 0.07 X10(3)/MCL (ref 0–0.9)
EOSINOPHIL NFR BLD AUTO: 0.8 %
ERYTHROCYTE [DISTWIDTH] IN BLOOD BY AUTOMATED COUNT: 15.9 % (ref 11.5–17)
GFR SERPLBLD CREATININE-BSD FMLA CKD-EPI: >60 MLS/MIN/1.73/M2
GLOBULIN SER-MCNC: 2.9 GM/DL (ref 2.4–3.5)
GLUCOSE SERPL-MCNC: 81 MG/DL (ref 74–100)
GROUP & RH: NORMAL
HCT VFR BLD AUTO: 31.7 % (ref 37–47)
HGB BLD-MCNC: 10.2 G/DL (ref 12–16)
IMM GRANULOCYTES # BLD AUTO: 0.07 X10(3)/MCL (ref 0–0.04)
IMM GRANULOCYTES NFR BLD AUTO: 0.8 %
INDIRECT COOMBS: NORMAL
LDH SERPL-CCNC: 158 U/L (ref 125–220)
LYMPHOCYTES # BLD AUTO: 1.98 X10(3)/MCL (ref 0.6–4.6)
LYMPHOCYTES NFR BLD AUTO: 22.5 %
MCH RBC QN AUTO: 29 PG (ref 27–31)
MCHC RBC AUTO-ENTMCNC: 32.2 G/DL (ref 33–36)
MCV RBC AUTO: 90.1 FL (ref 80–94)
MONOCYTES # BLD AUTO: 0.75 X10(3)/MCL (ref 0.1–1.3)
MONOCYTES NFR BLD AUTO: 8.5 %
NEUTROPHILS # BLD AUTO: 5.92 X10(3)/MCL (ref 2.1–9.2)
NEUTROPHILS NFR BLD AUTO: 67.2 %
NRBC BLD AUTO-RTO: 0.3 %
PLATELET # BLD AUTO: 180 X10(3)/MCL (ref 130–400)
PMV BLD AUTO: 10.6 FL (ref 7.4–10.4)
POCT GLUCOSE: 127 MG/DL (ref 70–110)
POTASSIUM SERPL-SCNC: 3.7 MMOL/L (ref 3.5–5.1)
PROT SERPL-MCNC: 5.4 GM/DL (ref 6.4–8.3)
PROT UR STRIP-MCNC: 18.9 MG/DL
RBC # BLD AUTO: 3.52 X10(6)/MCL (ref 4.2–5.4)
SODIUM SERPL-SCNC: 137 MMOL/L (ref 136–145)
SPECIMEN OUTDATE: NORMAL
T PALLIDUM AB SER QL: NONREACTIVE
URATE SERPL-MCNC: 5.5 MG/DL (ref 2.6–6)
URINE PROTEIN/CREATININE RATIO (OLG): 0.6
WBC # SPEC AUTO: 8.81 X10(3)/MCL (ref 4.5–11.5)

## 2024-01-30 PROCEDURE — 82570 ASSAY OF URINE CREATININE: CPT | Performed by: OBSTETRICS & GYNECOLOGY

## 2024-01-30 PROCEDURE — 11000001 HC ACUTE MED/SURG PRIVATE ROOM

## 2024-01-30 PROCEDURE — 63600175 PHARM REV CODE 636 W HCPCS: Performed by: OBSTETRICS & GYNECOLOGY

## 2024-01-30 PROCEDURE — 51702 INSERT TEMP BLADDER CATH: CPT

## 2024-01-30 PROCEDURE — 63600175 PHARM REV CODE 636 W HCPCS: Mod: JZ,JG

## 2024-01-30 PROCEDURE — 83615 LACTATE (LD) (LDH) ENZYME: CPT | Performed by: OBSTETRICS & GYNECOLOGY

## 2024-01-30 PROCEDURE — 80053 COMPREHEN METABOLIC PANEL: CPT | Performed by: OBSTETRICS & GYNECOLOGY

## 2024-01-30 PROCEDURE — 63600175 PHARM REV CODE 636 W HCPCS: Mod: JZ,JG | Performed by: OBSTETRICS & GYNECOLOGY

## 2024-01-30 PROCEDURE — 25000003 PHARM REV CODE 250: Performed by: OBSTETRICS & GYNECOLOGY

## 2024-01-30 PROCEDURE — 86850 RBC ANTIBODY SCREEN: CPT | Performed by: OBSTETRICS & GYNECOLOGY

## 2024-01-30 PROCEDURE — 85025 COMPLETE CBC W/AUTO DIFF WBC: CPT | Performed by: OBSTETRICS & GYNECOLOGY

## 2024-01-30 PROCEDURE — 84550 ASSAY OF BLOOD/URIC ACID: CPT | Performed by: OBSTETRICS & GYNECOLOGY

## 2024-01-30 PROCEDURE — 86780 TREPONEMA PALLIDUM: CPT | Performed by: OBSTETRICS & GYNECOLOGY

## 2024-01-30 PROCEDURE — 87653 STREP B DNA AMP PROBE: CPT | Performed by: OBSTETRICS & GYNECOLOGY

## 2024-01-30 RX ORDER — METFORMIN HYDROCHLORIDE 500 MG/1
1000 TABLET ORAL 2 TIMES DAILY WITH MEALS
Status: DISCONTINUED | OUTPATIENT
Start: 2024-01-30 | End: 2024-02-02

## 2024-01-30 RX ORDER — HYDRALAZINE HYDROCHLORIDE 20 MG/ML
10 INJECTION INTRAMUSCULAR; INTRAVENOUS ONCE AS NEEDED
Status: DISCONTINUED | OUTPATIENT
Start: 2024-01-30 | End: 2024-02-02 | Stop reason: SDUPTHER

## 2024-01-30 RX ORDER — MAGNESIUM SULFATE HEPTAHYDRATE 40 MG/ML
4 INJECTION, SOLUTION INTRAVENOUS ONCE
Status: COMPLETED | OUTPATIENT
Start: 2024-01-30 | End: 2024-01-30

## 2024-01-30 RX ORDER — SODIUM CHLORIDE, SODIUM LACTATE, POTASSIUM CHLORIDE, CALCIUM CHLORIDE 600; 310; 30; 20 MG/100ML; MG/100ML; MG/100ML; MG/100ML
INJECTION, SOLUTION INTRAVENOUS CONTINUOUS
Status: DISCONTINUED | OUTPATIENT
Start: 2024-01-30 | End: 2024-02-06 | Stop reason: HOSPADM

## 2024-01-30 RX ORDER — ACETAMINOPHEN 500 MG
1000 TABLET ORAL EVERY 6 HOURS PRN
Status: DISCONTINUED | OUTPATIENT
Start: 2024-01-30 | End: 2024-02-06 | Stop reason: HOSPADM

## 2024-01-30 RX ORDER — LABETALOL HCL 20 MG/4 ML
40 SYRINGE (ML) INTRAVENOUS ONCE AS NEEDED
Status: COMPLETED | OUTPATIENT
Start: 2024-01-30 | End: 2024-02-02

## 2024-01-30 RX ORDER — MAGNESIUM SULFATE HEPTAHYDRATE 40 MG/ML
2 INJECTION, SOLUTION INTRAVENOUS CONTINUOUS
Status: DISCONTINUED | OUTPATIENT
Start: 2024-01-30 | End: 2024-02-06 | Stop reason: HOSPADM

## 2024-01-30 RX ORDER — LABETALOL HCL 20 MG/4 ML
SYRINGE (ML) INTRAVENOUS
Status: COMPLETED
Start: 2024-01-30 | End: 2024-01-30

## 2024-01-30 RX ORDER — LABETALOL HCL 20 MG/4 ML
40 SYRINGE (ML) INTRAVENOUS ONCE AS NEEDED
Status: COMPLETED | OUTPATIENT
Start: 2024-01-30 | End: 2024-01-30

## 2024-01-30 RX ORDER — LABETALOL HCL 20 MG/4 ML
20 SYRINGE (ML) INTRAVENOUS ONCE AS NEEDED
Status: COMPLETED | OUTPATIENT
Start: 2024-01-30 | End: 2024-01-30

## 2024-01-30 RX ORDER — FAMOTIDINE 20 MG/1
20 TABLET, FILM COATED ORAL 2 TIMES DAILY
Status: DISCONTINUED | OUTPATIENT
Start: 2024-01-30 | End: 2024-02-01

## 2024-01-30 RX ORDER — BETAMETHASONE SODIUM PHOSPHATE AND BETAMETHASONE ACETATE 3; 3 MG/ML; MG/ML
12 INJECTION, SUSPENSION INTRA-ARTICULAR; INTRALESIONAL; INTRAMUSCULAR; SOFT TISSUE ONCE
Status: COMPLETED | OUTPATIENT
Start: 2024-01-30 | End: 2024-01-30

## 2024-01-30 RX ORDER — HYDRALAZINE HYDROCHLORIDE 20 MG/ML
10 INJECTION INTRAMUSCULAR; INTRAVENOUS ONCE AS NEEDED
Status: COMPLETED | OUTPATIENT
Start: 2024-01-30 | End: 2024-01-30

## 2024-01-30 RX ORDER — MAGNESIUM SULFATE HEPTAHYDRATE 40 MG/ML
INJECTION, SOLUTION INTRAVENOUS
Status: COMPLETED
Start: 2024-01-30 | End: 2024-01-30

## 2024-01-30 RX ORDER — LABETALOL HCL 20 MG/4 ML
20 SYRINGE (ML) INTRAVENOUS ONCE AS NEEDED
Status: COMPLETED | OUTPATIENT
Start: 2024-01-30 | End: 2024-02-02

## 2024-01-30 RX ORDER — LABETALOL HCL 20 MG/4 ML
80 SYRINGE (ML) INTRAVENOUS ONCE AS NEEDED
Status: COMPLETED | OUTPATIENT
Start: 2024-01-30 | End: 2024-02-02

## 2024-01-30 RX ORDER — LABETALOL HCL 20 MG/4 ML
80 SYRINGE (ML) INTRAVENOUS ONCE AS NEEDED
Status: COMPLETED | OUTPATIENT
Start: 2024-01-30 | End: 2024-01-30

## 2024-01-30 RX ORDER — BETAMETHASONE SODIUM PHOSPHATE AND BETAMETHASONE ACETATE 3; 3 MG/ML; MG/ML
12 INJECTION, SUSPENSION INTRA-ARTICULAR; INTRALESIONAL; INTRAMUSCULAR; SOFT TISSUE ONCE
Status: COMPLETED | OUTPATIENT
Start: 2024-01-31 | End: 2024-01-31

## 2024-01-30 RX ORDER — CALCIUM GLUCONATE 20 MG/ML
1 INJECTION, SOLUTION INTRAVENOUS
Status: DISCONTINUED | OUTPATIENT
Start: 2024-01-30 | End: 2024-02-06 | Stop reason: HOSPADM

## 2024-01-30 RX ADMIN — METFORMIN HYDROCHLORIDE 1000 MG: 500 TABLET, FILM COATED ORAL at 07:01

## 2024-01-30 RX ADMIN — SODIUM CHLORIDE, POTASSIUM CHLORIDE, SODIUM LACTATE AND CALCIUM CHLORIDE: 600; 310; 30; 20 INJECTION, SOLUTION INTRAVENOUS at 05:01

## 2024-01-30 RX ADMIN — LABETALOL HYDROCHLORIDE 300 MG: 100 TABLET, FILM COATED ORAL at 08:01

## 2024-01-30 RX ADMIN — LABETALOL HYDROCHLORIDE 20 MG: 5 INJECTION, SOLUTION INTRAVENOUS at 04:01

## 2024-01-30 RX ADMIN — BETAMETHASONE SODIUM PHOSPHATE AND BETAMETHASONE ACETATE 12 MG: 3; 3 INJECTION, SUSPENSION INTRA-ARTICULAR; INTRALESIONAL; INTRAMUSCULAR at 05:01

## 2024-01-30 RX ADMIN — ACETAMINOPHEN 1000 MG: 500 TABLET ORAL at 08:01

## 2024-01-30 RX ADMIN — MAGNESIUM SULFATE HEPTAHYDRATE 4 G: 40 INJECTION, SOLUTION INTRAVENOUS at 05:01

## 2024-01-30 RX ADMIN — LABETALOL HYDROCHLORIDE 80 MG: 5 INJECTION, SOLUTION INTRAVENOUS at 05:01

## 2024-01-30 RX ADMIN — INSULIN DETEMIR 14 UNITS: 100 INJECTION, SOLUTION SUBCUTANEOUS at 08:01

## 2024-01-30 RX ADMIN — HYDRALAZINE HYDROCHLORIDE 10 MG: 20 INJECTION INTRAMUSCULAR; INTRAVENOUS at 05:01

## 2024-01-30 RX ADMIN — Medication 40 MG: at 04:01

## 2024-01-30 RX ADMIN — LABETALOL HYDROCHLORIDE 40 MG: 5 INJECTION, SOLUTION INTRAVENOUS at 04:01

## 2024-01-30 RX ADMIN — FAMOTIDINE 20 MG: 20 TABLET, FILM COATED ORAL at 08:01

## 2024-01-30 NOTE — H&P
HISTORY AND PHYSICAL                                                OBSTETRICS          Subjective:      Rosemarie Good is a 33 y.o.  female with IUP at 33w3d weeks gestation who presents to L&D for severe range BP in office. She was diagnosed with GHTN at office visit last week.  Today she reports headache since this am, but has not taken any medication for it. She also reports worsening edema. Pregnancy complicated by diabetes and fibromyalgia.  Care this pregnancy has been with [facility] and she has also been seen by Dr. Marcano.     PMHx:   Past Medical History:   Diagnosis Date    Acid reflux     Allergy     Dyspareunia     Fibromyalgia     Frequent headaches     Gestational diabetes mellitus (GDM) affecting first pregnancy     Insulin resistance     Migraines     PCOS (polycystic ovarian syndrome)     Sinus problem     TMJ (dislocation of temporomandibular joint)        PSHx:   Past Surgical History:   Procedure Laterality Date    BIOPSY OF ADENOIDS      CHOLECYSTECTOMY      TONSILLECTOMY      UPPER GI SCOPE      WISDOM TOOTH EXTRACTION Bilateral        All: Review of patient's allergies indicates:  No Known Allergies    Meds:   Medications Prior to Admission   Medication Sig Dispense Refill Last Dose    ACCRUFER 30 mg Cap Take 1 capsule by mouth 2 (two) times daily.   2024 at 0615    aspirin (ECOTRIN) 81 MG EC tablet Take 1 tablet (81 mg total) by mouth once daily. (Patient taking differently: Take 81 mg by mouth nightly.) 60 tablet 3 2024 at 1930    DULoxetine (CYMBALTA) 20 MG capsule Take 20 mg by mouth once daily.   2024 at 1930    insulin detemir U-100, Levemir, (LEVEMIR FLEXPEN) 100 unit/mL (3 mL) InPn pen Inject 10u with breakfast and 14u at bedtime with diabetic snack (Patient taking differently: Inject into the skin. Inject 10u with breakfast and 14u at bedtime with diabetic snack) 6 mL 3 2024 at 0615    metFORMIN (GLUCOPHAGE) 1000 MG tablet Take 1 tablet  "(1,000 mg total) by mouth 2 (two) times a day. 60 tablet 4 2024 at 0615    montelukast (SINGULAIR) 10 mg tablet Take 10 mg by mouth once daily.   2024 at 0615    omeprazole (PRILOSEC) 40 MG capsule Take 40 mg by mouth 2 (two) times a day.   2024 at 0615    ondansetron (ZOFRAN) 8 MG tablet Take 8 mg by mouth every 8 (eight) hours as needed for Nausea.   Past Week    prenatal 25/iron fum/folic/dha (PRENATAL-1 ORAL) Take 1 tablet by mouth once daily.   2024 at 0615    blood sugar diagnostic Strp To check BG 4 times daily, to use with insurance preferred meter 100 strip 4     fluticasone propionate (FLONASE) 50 mcg/actuation nasal spray 2 sprays by Each Nostril route daily as needed.       lancets Misc To check BG 4 times daily, to use with insurance preferred meter 100 each 4     pen needle, diabetic (PEN NEEDLE) 31 gauge x 5/16" Ndle 1 application  by Misc.(Non-Drug; Combo Route) route nightly. Use pen needle to inject insulin every night at bedtime 30 each 1     TRUE METRIX GLUCOSE METER [facility]           SH:   Social History     Socioeconomic History    Marital status:    Tobacco Use    Smoking status: Never    Smokeless tobacco: Never   Substance and Sexual Activity    Alcohol use: Not Currently    Drug use: Never    Sexual activity: Yes     Partners: Male     Birth control/protection: None       FH:   Family History   Problem Relation Age of Onset    Diabetes Mother     Hyperlipidemia Mother     Hypertension Mother     Hyperlipidemia Father     Asthma Father        OBHx:   OB History    Para Term  AB Living   1 0 0 0 0 0   SAB IAB Ectopic Multiple Live Births   0 0 0 0 0      # Outcome Date GA Lbr Abdias/2nd Weight Sex Delivery Anes PTL Lv   1 Current                Objective:      BP (!) 167/99 Comment: 10 mg IV Hydralazine given  Pulse 77   Temp 99.1 °F (37.3 °C)   Resp 18   Ht 5' 4" (1.626 m)   Wt 98.9 kg (218 lb)   LMP  (LMP Unknown)   SpO2 100%   Breastfeeding No   " BMI 37.42 kg/m²   Body mass index is 37.42 kg/m².    General:   alert and cooperative   HEENT:  normocephalic, atraumatic   Lungs:   clear to auscultation bilaterally   Heart:   regular rate and rhythm, S1, S2 normal   Abdomen:  gravid, non-tender   Extremities non-tender, no edema   Derm: no rashes or lesions   Psych: appropriate mood and affect   Pelvis:  adequate       FHT: Category: 1                 TOCO: Contractions: none       Cervix: Deferred      Lab Review  GBS: unknown           Latest Reference Range & Units 24 16:50   WBC 4.50 - 11.50 x10(3)/mcL 8.81   RBC 4.20 - 5.40 x10(6)/mcL 3.52 (L)   Hemoglobin 12.0 - 16.0 g/dL 10.2 (L)   Hematocrit 37.0 - 47.0 % 31.7 (L)   MCV 80.0 - 94.0 fL 90.1   MCH 27.0 - 31.0 pg 29.0   MCHC 33.0 - 36.0 g/dL 32.2 (L)   RDW 11.5 - 17.0 % 15.9   Platelet Count 130 - 400 x10(3)/mcL 180   MPV 7.4 - 10.4 fL 10.6 (H)   Neut % % 67.2   LYMPH % % 22.5   Mono % % 8.5   Eosinophil % % 0.8   Basophil % % 0.2   Immature Granulocytes % 0.8   Neut # 2.1 - 9.2 x10(3)/mcL 5.92   Lymph # 0.6 - 4.6 x10(3)/mcL 1.98   Mono # 0.1 - 1.3 x10(3)/mcL 0.75   Eos # 0 - 0.9 x10(3)/mcL 0.07   Baso # <=0.2 x10(3)/mcL 0.02   Immature Grans (Abs) 0 - 0.04 x10(3)/mcL 0.07 (H)   nRBC % 0.3   Sodium 136 - 145 mmol/L 137   Potassium 3.5 - 5.1 mmol/L 3.7   Chloride 98 - 107 mmol/L 110 (H)   CO2 22 - 29 mmol/L 21 (L)   BUN 7.0 - 18.7 mg/dL 7.1   Creatinine 0.55 - 1.02 mg/dL 0.69   eGFR mls/min/1.73/m2 >60   Glucose 74 - 100 mg/dL 81   Calcium 8.4 - 10.2 mg/dL 8.3 (L)   ALP 40 - 150 unit/L 92   PROTEIN TOTAL 6.4 - 8.3 gm/dL 5.4 (L)   Albumin 3.5 - 5.0 g/dL 2.5 (L)   Albumin/Globulin Ratio 1.1 - 2.0 ratio 0.9 (L)   Uric Acid 2.6 - 6.0 mg/dL 5.5   BILIRUBIN TOTAL <=1.5 mg/dL 0.2   AST 5 - 34 unit/L 13   ALT 0 - 55 unit/L 8   Globulin, Total 2.4 - 3.5 gm/dL 2.9        24 16:57   URINE PROTEIN/CREATININE RATIO ([facility]) 0.6     Assessment:     33 y.o.  at 33w3d weeks gestation.  Severe  Preeclampsia  Diabetes mellitus  Fibromyalgia    There are no hospital problems to display for this patient.         Plan:     S/p Labetalol and hydralazine protocol for BP control.  Will start labetalol 300mg BID since no longer severe range  BMZ x2  Mag sulfate 4g bolus, the 2g/h IV  Will resume home metformin 1000mg BID & Levemir 10u qam & 14u qHS  CBG fasting and 2h PP  SCDs for DVT prophylaxis  GBS to be collected  Long discussion with Rosemarie regarding new diagnosis of severe preeclampsia with goal of delivery at 34 wga.  We discussed that if maternal or fetal status worsen, that earlier delivery may be indicated.  Will repeat serum labs in am

## 2024-01-31 LAB
ALBUMIN SERPL-MCNC: 2.3 G/DL (ref 3.5–5)
ALBUMIN/GLOB SERPL: 0.8 RATIO (ref 1.1–2)
ALP SERPL-CCNC: 86 UNIT/L (ref 40–150)
ALT SERPL-CCNC: 7 UNIT/L (ref 0–55)
AST SERPL-CCNC: 11 UNIT/L (ref 5–34)
BASOPHILS # BLD AUTO: 0.01 X10(3)/MCL
BASOPHILS NFR BLD AUTO: 0.1 %
BILIRUB SERPL-MCNC: 0.1 MG/DL
BUN SERPL-MCNC: 8.9 MG/DL (ref 7–18.7)
CALCIUM SERPL-MCNC: 7.8 MG/DL (ref 8.4–10.2)
CHLORIDE SERPL-SCNC: 110 MMOL/L (ref 98–107)
CO2 SERPL-SCNC: 13 MMOL/L (ref 22–29)
CREAT SERPL-MCNC: 0.64 MG/DL (ref 0.55–1.02)
EOSINOPHIL # BLD AUTO: 0 X10(3)/MCL (ref 0–0.9)
EOSINOPHIL NFR BLD AUTO: 0 %
ERYTHROCYTE [DISTWIDTH] IN BLOOD BY AUTOMATED COUNT: 16.4 % (ref 11.5–17)
GFR SERPLBLD CREATININE-BSD FMLA CKD-EPI: >60 MLS/MIN/1.73/M2
GLOBULIN SER-MCNC: 2.8 GM/DL (ref 2.4–3.5)
GLUCOSE SERPL-MCNC: 137 MG/DL (ref 74–100)
HCT VFR BLD AUTO: 29.2 % (ref 37–47)
HGB BLD-MCNC: 9.6 G/DL (ref 12–16)
IMM GRANULOCYTES # BLD AUTO: 0.29 X10(3)/MCL (ref 0–0.04)
IMM GRANULOCYTES NFR BLD AUTO: 2.9 %
LDH SERPL-CCNC: 143 U/L (ref 125–220)
LYMPHOCYTES # BLD AUTO: 0.79 X10(3)/MCL (ref 0.6–4.6)
LYMPHOCYTES NFR BLD AUTO: 7.9 %
MCH RBC QN AUTO: 29.4 PG (ref 27–31)
MCHC RBC AUTO-ENTMCNC: 32.9 G/DL (ref 33–36)
MCV RBC AUTO: 89.6 FL (ref 80–94)
MONOCYTES # BLD AUTO: 0.18 X10(3)/MCL (ref 0.1–1.3)
MONOCYTES NFR BLD AUTO: 1.8 %
NEUTROPHILS # BLD AUTO: 8.69 X10(3)/MCL (ref 2.1–9.2)
NEUTROPHILS NFR BLD AUTO: 87.3 %
NRBC BLD AUTO-RTO: 0.4 %
PLATELET # BLD AUTO: 179 X10(3)/MCL (ref 130–400)
PMV BLD AUTO: 10.1 FL (ref 7.4–10.4)
POCT GLUCOSE: 100 MG/DL (ref 70–110)
POCT GLUCOSE: 133 MG/DL (ref 70–110)
POCT GLUCOSE: 97 MG/DL (ref 70–110)
POTASSIUM SERPL-SCNC: 4.1 MMOL/L (ref 3.5–5.1)
PROT SERPL-MCNC: 5.1 GM/DL (ref 6.4–8.3)
RBC # BLD AUTO: 3.26 X10(6)/MCL (ref 4.2–5.4)
SODIUM SERPL-SCNC: 136 MMOL/L (ref 136–145)
URATE SERPL-MCNC: 6.3 MG/DL (ref 2.6–6)
WBC # SPEC AUTO: 9.96 X10(3)/MCL (ref 4.5–11.5)

## 2024-01-31 PROCEDURE — 83615 LACTATE (LD) (LDH) ENZYME: CPT | Performed by: OBSTETRICS & GYNECOLOGY

## 2024-01-31 PROCEDURE — 25000003 PHARM REV CODE 250: Performed by: OBSTETRICS & GYNECOLOGY

## 2024-01-31 PROCEDURE — 84550 ASSAY OF BLOOD/URIC ACID: CPT | Performed by: OBSTETRICS & GYNECOLOGY

## 2024-01-31 PROCEDURE — 63600175 PHARM REV CODE 636 W HCPCS: Performed by: OBSTETRICS & GYNECOLOGY

## 2024-01-31 PROCEDURE — 85025 COMPLETE CBC W/AUTO DIFF WBC: CPT | Performed by: OBSTETRICS & GYNECOLOGY

## 2024-01-31 PROCEDURE — 99223 1ST HOSP IP/OBS HIGH 75: CPT | Mod: ,,, | Performed by: OBSTETRICS & GYNECOLOGY

## 2024-01-31 PROCEDURE — 80053 COMPREHEN METABOLIC PANEL: CPT | Performed by: OBSTETRICS & GYNECOLOGY

## 2024-01-31 PROCEDURE — 11000001 HC ACUTE MED/SURG PRIVATE ROOM

## 2024-01-31 RX ADMIN — ACETAMINOPHEN 1000 MG: 500 TABLET ORAL at 09:01

## 2024-01-31 RX ADMIN — INSULIN DETEMIR 10 UNITS: 100 INJECTION, SOLUTION SUBCUTANEOUS at 08:01

## 2024-01-31 RX ADMIN — FAMOTIDINE 20 MG: 20 TABLET, FILM COATED ORAL at 08:01

## 2024-01-31 RX ADMIN — LABETALOL HYDROCHLORIDE 300 MG: 100 TABLET, FILM COATED ORAL at 09:01

## 2024-01-31 RX ADMIN — MAGNESIUM SULFATE HEPTAHYDRATE 2 G/HR: 40 INJECTION, SOLUTION INTRAVENOUS at 10:01

## 2024-01-31 RX ADMIN — METFORMIN HYDROCHLORIDE 1000 MG: 500 TABLET, FILM COATED ORAL at 07:01

## 2024-01-31 RX ADMIN — INSULIN DETEMIR 14 UNITS: 100 INJECTION, SOLUTION SUBCUTANEOUS at 09:01

## 2024-01-31 RX ADMIN — BETAMETHASONE SODIUM PHOSPHATE AND BETAMETHASONE ACETATE 12 MG: 3; 3 INJECTION, SUSPENSION INTRA-ARTICULAR; INTRALESIONAL; INTRAMUSCULAR at 06:01

## 2024-01-31 RX ADMIN — SODIUM CHLORIDE, POTASSIUM CHLORIDE, SODIUM LACTATE AND CALCIUM CHLORIDE: 600; 310; 30; 20 INJECTION, SOLUTION INTRAVENOUS at 05:01

## 2024-01-31 RX ADMIN — METFORMIN HYDROCHLORIDE 1000 MG: 500 TABLET, FILM COATED ORAL at 06:01

## 2024-01-31 RX ADMIN — LABETALOL HYDROCHLORIDE 300 MG: 100 TABLET, FILM COATED ORAL at 08:01

## 2024-01-31 NOTE — CONSULTS
Critical Care  Services  [facility]    Neonatology Consultation Report        REASON FOR CONSULTATION: 33 4/7 weeks gestation; severe pre-eclampsia; insulin dependent gestational diabetes;     REFERRING PHYSICIAN: Dr. Paul    HISTORY:   Ms. Good is a 33 year old  female at 33 4/7 weeks gestation. She is currently undergoing inpatient management of severe preeclampsia. This pregnancy has also been complicated by insulin dependent gestational diabetes and fibromyalgia.  Prenatal labs with O+ blood type, negative hepatitis B, negative HIV, rubella immune, and RPR non-reactive. Medications this pregnancy include metformin, levemir, duloxetine, and prenatal vitamins. While inpatient, she has also received hydralazine, labetalol, magnesium, and betamethasone. Her membranes are intact and there are currently no signs of  labor. Current plan is for delivery at 34 weeks gestation, or sooner if indicated by maternal or fetal status.     ASSESSMENT:  This is a 32 yo  mother with pre-eclampsia with plan for delivery at 34 weeks. I discussed with mother the typical course at 33-34 weeks including respiratory, nutritional, temperature, and cardiorespiratory instability. I explained the possible need for respiratory support and close monitoring for the typical late  infant.  I stated that survival is quite good for the late  infant, but they need NICU monitoring to ensure safe transition and improved long-term development. The exact course and timing of discharge of her infant is unpredictable at this time. She stated understanding of this. She wishes to provide breast milk for her infant son.    RECOMMENDATIONS:  Please continue to keep us updated on mother's hospital course. Please feel free to call for further questions. Thank you for allowing us to participate in the care of this family.       Time: 80 minutes total time of consult including review of  maternal chart, face time with mother, and preparing of consult note.

## 2024-01-31 NOTE — PROGRESS NOTES
[facility] - Antepartum  Obstetrics  Antepartum Progress Note    Patient Name: Rosemarie Good  MRN: [id number]  Admission Date: 2024  Hospital Length of Stay: 1 days  Attending Physician: Paola Hale MD  Primary Care Provider: Elodia, Primary Doctor    Subjective:     Principal Problem:Severe preeclampsia, third trimester    HPI: Rosemarie Good is a 33 y.o.  female with IUP at 33w4d weeks gestation who presents to L&D for severe range BP in office. She was diagnosed with GHTN at office visit last week.  She presented to office yesterday with severe range pressures, complaints of HA, increasing swelling, and rapid weight gain of 9 lbs in 7 days. Pregnancy complicated by diabetes and fibromyalgia.  Care this pregnancy has been with [facility] and she has also been seen by Dr. Marcano.     Obstetric HPI:  Patient reports None contractions, active fetal movement, absent vaginal bleeding , absent loss of fluid      Objective:     Vital Signs (Most Recent):  Temp: 97.8 °F (36.6 °C) (24 0650)  Pulse: 91 (24 0820)  Resp: 18 (24 0750)  BP: (!) 155/93 (24 0752)  SpO2: 100 % (24 0819) Vital Signs (24h Range):  Temp:  [97.8 °F (36.6 °C)-99.1 °F (37.3 °C)] 97.8 °F (36.6 °C)  Pulse:  [] 91  Resp:  [16-22] 18  SpO2:  [93 %-100 %] 100 %  BP: (110-182)/() 155/93     Weight: 98.9 kg (218 lb)  Body mass index is 37.42 kg/m².    + FHT    Intake/Output Summary (Last 24 hours) at 2024 0837  Last data filed at 2024 0750  Gross per 24 hour   Intake 1750 ml   Output 3005 ml   Net -1255 ml       Physical Exam  DTRs normal  Negative for clonus  Clear, regular breath sounds  CV normal rate and rhythm     Significant Labs:  Recent Lab Results  (Last 5 results in the past 24 hours)        24  0631   24  0433   24  2056   24  1817   24  1657        Albumin/Globulin Ratio   0.8             Albumin   2.3              ALP   86             ALT   7             AST   11             Baso #   0.01             Basophil %   0.1             BILIRUBIN TOTAL   0.1             BUN   8.9             Calcium   7.8             Chloride   110             CO2   13             Creatinine   0.64             Creatinine, Urine         29.7       eGFR   >60             Eos #   0.00             Eosinophil %   0.0             Globulin, Total   2.8             Glucose   137             Hematocrit   29.2             Hemoglobin   9.6             Immature Grans (Abs)   0.29             Immature Granulocytes   2.9             LD   143             Lymph #   0.79             LYMPH %   7.9             MCH   29.4             MCHC   32.9             MCV   89.6             Mono #   0.18             Mono %   1.8             MPV   10.1             Neut #   8.69             Neut %   87.3             nRBC   0.4             Platelet Count   179             POCT Glucose 133     127           Potassium   4.1             PROTEIN TOTAL   5.1             Urine Protein         18.9       RBC   3.26             RDW   16.4             Sodium   136             STREP B PCR ([facility])       Not Detected  [P]         Uric Acid   6.3             URINE PROTEIN/CREATININE RATIO ([facility])         0.6       WBC   9.96                                     [P] - Preliminary Result               Assessment/Plan:     33 y.o. female  at 33w4d for:    Active Diagnoses:    Diagnosis Date Noted POA    PRINCIPAL PROBLEM:  Severe preeclampsia, third trimester [O14.13] 2024 Unknown      Problems Resolved During this Admission:       Plan:  S/p Labetalol and hydralazine protocol for BP control.  Started oral labetalol 300mg BID since no longer severe range  BMZ x2--second dose today at 1800  Mag sulfate 4g bolus, the 2g/h IV. 24 hour completion at 1750  Taking home metformin 1000mg BID & Levemir 10u qam & 14u qHS  CBG fasting and 2h PP  SCDs for DVT prophylaxis  GBS collected and pending  Long  discussion with Rosemarie regarding new diagnosis of severe preeclampsia with goal of delivery at 34 wga.  We discussed that if maternal or fetal status worsen, that earlier delivery may be indicated.  Repeat serum labs this morning are stable  Discussed case with Dr. Marcano and Dr. Hale  NICU consulted      SHAGUFTA RAMIREZ NP  Obstetrics  [facility] - [facility]te[facility]

## 2024-01-31 NOTE — PLAN OF CARE
Problem: Adult Inpatient Plan of Care  Goal: Plan of Care Review  Outcome: Ongoing, Progressing  Goal: Absence of Hospital-Acquired Illness or Injury  Outcome: Ongoing, Progressing  Goal: Optimal Comfort and Wellbeing  Outcome: Ongoing, Progressing  Goal: Readiness for Transition of Care  Outcome: Ongoing, Progressing     Problem: Diabetes Comorbidity  Goal: Blood Glucose Level Within Targeted Range  Outcome: Ongoing, Progressing     Problem: Hypertensive Disorders in Pregnancy  Goal: Maternal-Fetal Stabilization  Outcome: Ongoing, Progressing

## 2024-01-31 NOTE — CONSULTS
Consultation Note  Maternal Fetal Medicine          Subjective:         Rosemarie Good is a 33 y.o.  female with IUP at 33w4d with gestational diabetes (h/o PCOS/ insulin resistance prior to pregnancy) here with new diagnosis of preeclampsia with severe features.     She presented to her OB office yesterday afternoon where Bps were severe and she was sent to the hospital. She denies HA, vision changes. She has significant LE edema that recently developed (gained 9lbs this week). She had further severe range Bps in the hospital and got IV labetalol and hydralazine protocols and was started on magnesium sulfate. She was given  corticosteroids starting yesterday.     She notably has had GDM on insulin and metformin.She has had limitations with checking BG due to work schedule. She also has h/o fibromyalgia, migraines, palpitations.     PMHx:   Past Medical History:   Diagnosis Date    Acid reflux     Allergy     Dyspareunia     Fibromyalgia     Frequent headaches     Gestational diabetes mellitus (GDM) affecting first pregnancy     Insulin resistance     Migraines     PCOS (polycystic ovarian syndrome)     Sinus problem     TMJ (dislocation of temporomandibular joint)        PSHx:   Past Surgical History:   Procedure Laterality Date    BIOPSY OF ADENOIDS      CHOLECYSTECTOMY      TONSILLECTOMY      UPPER GI SCOPE      WISDOM TOOTH EXTRACTION Bilateral        All: Review of patient's allergies indicates:  No Known Allergies    Meds:   Medications Prior to Admission   Medication Sig Dispense Refill Last Dose    ACCRUFER 30 mg Cap Take 1 capsule by mouth 2 (two) times daily.   2024 at 0615    aspirin (ECOTRIN) 81 MG EC tablet Take 1 tablet (81 mg total) by mouth once daily. (Patient taking differently: Take 81 mg by mouth nightly.) 60 tablet 3 2024 at 1930    DULoxetine (CYMBALTA) 20 MG capsule Take 20 mg by mouth once daily.   2024 at 1930    insulin detemir U-100, Levemir, (LEVEMIR  "FLEXPEN) 100 unit/mL (3 mL) InPn pen Inject 10u with breakfast and 14u at bedtime with diabetic snack (Patient taking differently: Inject into the skin. Inject 10u with breakfast and 14u at bedtime with diabetic snack) 6 mL 3 2024 at 0615    metFORMIN (GLUCOPHAGE) 1000 MG tablet Take 1 tablet (1,000 mg total) by mouth 2 (two) times a day. 60 tablet 4 2024 at 0615    montelukast (SINGULAIR) 10 mg tablet Take 10 mg by mouth once daily.   2024 at 0615    omeprazole (PRILOSEC) 40 MG capsule Take 40 mg by mouth 2 (two) times a day.   2024 at 0615    ondansetron (ZOFRAN) 8 MG tablet Take 8 mg by mouth every 8 (eight) hours as needed for Nausea.   Past Week    prenatal 25/iron fum/folic/dha (PRENATAL-1 ORAL) Take 1 tablet by mouth once daily.   2024 at 0615    blood sugar diagnostic Strp To check BG 4 times daily, to use with insurance preferred meter 100 strip 4     fluticasone propionate (FLONASE) 50 mcg/actuation nasal spray 2 sprays by Each Nostril route daily as needed.       lancets Misc To check BG 4 times daily, to use with insurance preferred meter 100 each 4     pen needle, diabetic (PEN NEEDLE) 31 gauge x 5/16" Ndle 1 application  by Misc.(Non-Drug; Combo Route) route nightly. Use pen needle to inject insulin every night at bedtime 30 each 1     TRUE METRIX GLUCOSE METER [facility]           SH:   Social History     Socioeconomic History    Marital status:    Tobacco Use    Smoking status: Never    Smokeless tobacco: Never   Substance and Sexual Activity    Alcohol use: Not Currently    Drug use: Never    Sexual activity: Yes     Partners: Male     Birth control/protection: None       FH:   Family History   Problem Relation Age of Onset    Diabetes Mother     Hyperlipidemia Mother     Hypertension Mother     Hyperlipidemia Father     Asthma Father        OBHx:   OB History    Para Term  AB Living   1 0 0 0 0 0   SAB IAB Ectopic Multiple Live Births   0 0 0 0 0      # " Outcome Date GA Lbr Abdias/2nd Weight Sex Delivery Anes PTL Lv   1 Current                Objective:         Temp:  [97.8 °F (36.6 °C)-99.1 °F (37.3 °C)] 98.1 °F (36.7 °C)  Pulse:  [] 90  Resp:  [16-22] 17  SpO2:  [93 %-100 %] 99 %  BP: (110-182)/() 131/94    Gen: NAD, A&Ox3  Pulm: Unlabored breathing, LCTAB  Card: RRR  Abd: FHT present, soft, nondistended, nontender to palpation, gravid uterus palpable c/w gestational age  Extremities: Palpable peripheral pulses, 2+ pitting edema,  DTRs x 4    NST: 135 baseline, moderate BTBV, pos accelerations, neg decelerations  [place]: Occasional  US: vertex presentation, Fundal placenta, GOLDY 8.5cm.    Lab Review  Blood Type: O POS  GBBS: NEG (1/30)    Recent Results (from the past 24 hour(s))   Comprehensive metabolic panel    Collection Time: 01/30/24  4:50 PM   Result Value Ref Range    Sodium Level 137 136 - 145 mmol/L    Potassium Level 3.7 3.5 - 5.1 mmol/L    Chloride 110 (H) 98 - 107 mmol/L    Carbon Dioxide 21 (L) 22 - 29 mmol/L    Glucose Level 81 74 - 100 mg/dL    Blood Urea Nitrogen 7.1 7.0 - 18.7 mg/dL    Creatinine 0.69 0.55 - 1.02 mg/dL    Calcium Level Total 8.3 (L) 8.4 - 10.2 mg/dL    Protein Total 5.4 (L) 6.4 - 8.3 gm/dL    Albumin Level 2.5 (L) 3.5 - 5.0 g/dL    Globulin 2.9 2.4 - 3.5 gm/dL    Albumin/Globulin Ratio 0.9 (L) 1.1 - 2.0 ratio    Bilirubin Total 0.2 <=1.5 mg/dL    Alkaline Phosphatase 92 40 - 150 unit/L    Alanine Aminotransferase 8 0 - 55 unit/L    Aspartate Aminotransferase 13 5 - 34 unit/L    eGFR >60 mls/min/1.73/m2   Lactate Dehydrogenase    Collection Time: 01/30/24  4:50 PM   Result Value Ref Range    Lactate Dehydrogenase 158 125 - 220 U/L   Uric acid    Collection Time: 01/30/24  4:50 PM   Result Value Ref Range    Uric Acid 5.5 2.6 - 6.0 mg/dL   Type & Screen    Collection Time: 01/30/24  4:50 PM   Result Value Ref Range    Group & Rh O POS     Indirect Timi GEL NEG     Specimen Outdate 02/02/2024 23:59    SYPHILIS ANTIBODY (WITH  REFLEX RPR)    Collection Time: 01/30/24  4:50 PM   Result Value Ref Range    Syphilis Antibody Nonreactive Nonreactive, Equivocal   CBC with Differential    Collection Time: 01/30/24  4:50 PM   Result Value Ref Range    WBC 8.81 4.50 - 11.50 x10(3)/mcL    RBC 3.52 (L) 4.20 - 5.40 x10(6)/mcL    Hgb 10.2 (L) 12.0 - 16.0 g/dL    Hct 31.7 (L) 37.0 - 47.0 %    MCV 90.1 80.0 - 94.0 fL    MCH 29.0 27.0 - 31.0 pg    MCHC 32.2 (L) 33.0 - 36.0 g/dL    RDW 15.9 11.5 - 17.0 %    Platelet 180 130 - 400 x10(3)/mcL    MPV 10.6 (H) 7.4 - 10.4 fL    Neut % 67.2 %    Lymph % 22.5 %    Mono % 8.5 %    Eos % 0.8 %    Basophil % 0.2 %    Lymph # 1.98 0.6 - 4.6 x10(3)/mcL    Neut # 5.92 2.1 - 9.2 x10(3)/mcL    Mono # 0.75 0.1 - 1.3 x10(3)/mcL    Eos # 0.07 0 - 0.9 x10(3)/mcL    Baso # 0.02 <=0.2 x10(3)/mcL    IG# 0.07 (H) 0 - 0.04 x10(3)/mcL    IG% 0.8 %    NRBC% 0.3 %   Protein / creatinine ratio, urine    Collection Time: 01/30/24  4:57 PM   Result Value Ref Range    Urine Protein Level 18.9 mg/dL    Urine Creatinine 29.7 (L) 45.0 - 106.0 mg/dL    Urine Protein/Creatinine Ratio 0.6    Strep Group B by PCR    Collection Time: 01/30/24  6:17 PM   Result Value Ref Range    STREP B PCR ([facility]) Not Detected Not Detected   POCT glucose    Collection Time: 01/30/24  8:56 PM   Result Value Ref Range    POCT Glucose 127 (H) 70 - 110 mg/dL   Comprehensive Metabolic Panel    Collection Time: 01/31/24  4:33 AM   Result Value Ref Range    Sodium Level 136 136 - 145 mmol/L    Potassium Level 4.1 3.5 - 5.1 mmol/L    Chloride 110 (H) 98 - 107 mmol/L    Carbon Dioxide 13 (L) 22 - 29 mmol/L    Glucose Level 137 (H) 74 - 100 mg/dL    Blood Urea Nitrogen 8.9 7.0 - 18.7 mg/dL    Creatinine 0.64 0.55 - 1.02 mg/dL    Calcium Level Total 7.8 (L) 8.4 - 10.2 mg/dL    Protein Total 5.1 (L) 6.4 - 8.3 gm/dL    Albumin Level 2.3 (L) 3.5 - 5.0 g/dL    Globulin 2.8 2.4 - 3.5 gm/dL    Albumin/Globulin Ratio 0.8 (L) 1.1 - 2.0 ratio    Bilirubin Total 0.1 <=1.5 mg/dL     Alkaline Phosphatase 86 40 - 150 unit/L    Alanine Aminotransferase 7 0 - 55 unit/L    Aspartate Aminotransferase 11 5 - 34 unit/L    eGFR >60 mls/min/1.73/m2   Lactate Dehydrogenase    Collection Time: 24  4:33 AM   Result Value Ref Range    Lactate Dehydrogenase 143 125 - 220 U/L   Uric Acid    Collection Time: 24  4:33 AM   Result Value Ref Range    Uric Acid 6.3 (H) 2.6 - 6.0 mg/dL   CBC with Differential    Collection Time: 24  4:33 AM   Result Value Ref Range    WBC 9.96 4.50 - 11.50 x10(3)/mcL    RBC 3.26 (L) 4.20 - 5.40 x10(6)/mcL    Hgb 9.6 (L) 12.0 - 16.0 g/dL    Hct 29.2 (L) 37.0 - 47.0 %    MCV 89.6 80.0 - 94.0 fL    MCH 29.4 27.0 - 31.0 pg    MCHC 32.9 (L) 33.0 - 36.0 g/dL    RDW 16.4 11.5 - 17.0 %    Platelet 179 130 - 400 x10(3)/mcL    MPV 10.1 7.4 - 10.4 fL    Neut % 87.3 %    Lymph % 7.9 %    Mono % 1.8 %    Eos % 0.0 %    Basophil % 0.1 %    Lymph # 0.79 0.6 - 4.6 x10(3)/mcL    Neut # 8.69 2.1 - 9.2 x10(3)/mcL    Mono # 0.18 0.1 - 1.3 x10(3)/mcL    Eos # 0.00 0 - 0.9 x10(3)/mcL    Baso # 0.01 <=0.2 x10(3)/mcL    IG# 0.29 (H) 0 - 0.04 x10(3)/mcL    IG% 2.9 %    NRBC% 0.4 %   POCT glucose    Collection Time: 24  6:31 AM   Result Value Ref Range    POCT Glucose 133 (H) 70 - 110 mg/dL   POCT glucose    Collection Time: 24 10:30 AM   Result Value Ref Range    POCT Glucose 100 70 - 110 mg/dL       Assessment:       33 y.o.  at 33w4d weeks gestation admitted for preeclampsia with severe features.     Active Hospital Problems    Diagnosis  POA    *Severe preeclampsia, third trimester [O14.13]  Unknown      Resolved Hospital Problems   No resolved problems to display.        Plan:        1. Severe pre-eclampsia  I counseled the patient regarding the suspected diagnosis. Other causes of elevated blood pressure and proteinuria include chronic renal disease, autoimmune disorders, chronic hypertension, and diabetes. Additionally, certain urinary tract pathogens can cause  proteinuria, however, are unlikely to be associated with hypertension unless prolonged kidney disease is present. I reviewed the diagnosis of preeclampsia, the potential course of this disease, maternal-fetal complications, the lack of any available therapy to prevent progression, the likelihood of resolution post-delivery, and the potential for recurrence in a subsequent pregnancy. I counseled the patient that one of the major fetal considerations in a pregnancy complicated by preeclampsia are those that are related to prematurity. With the presence of severely elevated blood pressures since admit (>4h apart) and proteinuria, she meets diagnostic criteria for severe pre-eclampsia.   Recommendations:  Inpatient management with planned delivery at 34 weeks if maternal and fetal status allows  Continuous monitoring at this time due to magnesium sulfate. Once Bps well controlled, fetal status remains reassuring, and she is without neuro symptoms, may decrease to NST 1hr twice daily. If she experiences severe range Bps, neuro symptoms, or decreased FM/VB/LOF, resume continuous monitoring.  Serial blood pressure assessments (at least every 4 hours) while admitted.   IV Labetalol protocol PRN severe range Bps  Currently on Labetalol 300mg BID. Titrate to keep Bps <140/90. If severe again use IV algorithm and consider moving forward with delivery.  Finish 24hr urine collection.  Obtain CBC, CMP at completion of 24hr urine and repeat Q3days unless new symptoms arise.  If 24hr urine >3000mg, start prophylactic Lovenox for increased risk of VTE in setting of nephrotic range proteinuria.   If 24hr urine <3000mg, SCDs are sufficient DVT prophylaxis  Daily weights  ANCS 1/30-31  She is s/p magnesium sulfate. If uncontrolled severe range Bps, lab abnormalities, or neuro symptoms become apparent, restart magnesium for seizure prophylaxis. Due to her gestational age and added benefit of neuroprotection, recommend 6g load then  2g/hr. Post delivery, recommend 24hrs of Mag.  NICU consult     2. Routine prenatal care  -Blood type/Ab screen   - GBS neg        3. GDM  - Continue metformin and insulin outpatient regimen: Regimen: Metformin 1000mg BID; Levemir 10u QAM/14u QHS   - Order SSI now as she is getting steroids  - Follow routine management for postpartum- should be able to stop all meds        Thank you for allowing us to participate in the care of your patient. If you have any questions/concerns, please do not hesitate to contact us.     Trinity Marcano MD  Maternal Fetal Medicine

## 2024-01-31 NOTE — PROGRESS NOTES
Inpatient Nutrition Evaluation    Admit Date: 2024   Total duration of encounter: 1 day   Patient Age: 33 y.o.    Nutrition Recommendation/Prescription     Continue Diabetic, Low Na diet as tolerated  Continue medical management of blood sugars.    Nutrition Assessment     Chart Review    Reason Seen: continuous nutrition monitoring    Malnutrition Screening Tool Results              Diagnosis:   female with IUP at 33w4d weeks gestation with Severe preeclampsia, third trimester, HA, increasing swelling, and rapid weight gain of 9 lbs in 7 days     Relevant Medical History: n/a    Scheduled Medications:  betamethasone acetate-betamethasone sodium phosphate, 12 mg, Once  famotidine, 20 mg, BID  insulin detemir U-100, 10 Units, Daily  insulin detemir U-100, 14 Units, QHS  labetaloL, 300 mg, Q12H  metFORMIN, 1,000 mg, BID WM    Continuous Infusions:  lactated ringers, Last Rate: 75 mL/hr at 24 1150  magnesium sulfate in water, Last Rate: 2 g/hr (24 1150)    PRN Medications: acetaminophen, calcium gluconate 1 g, hydrALAZINE, labetalol, labetalol, labetalol    Recent Labs   Lab 24  1650 24  0433    136   K 3.7 4.1   CALCIUM 8.3* 7.8*   CHLORIDE 110* 110*   CO2 21* 13*   BUN 7.1 8.9   CREATININE 0.69 0.64   EGFRNORACEVR >60 >60   GLUCOSE 81 137*   BILITOT 0.2 0.1   ALKPHOS 92 86   ALT 8 7   AST 13 11   ALBUMIN 2.5* 2.3*   WBC 8.81 9.96   HGB 10.2* 9.6*   HCT 31.7* 29.2*     Nutrition Orders:  Diet diabetic Low Sodium      Appetite/Oral Intake: good/% of meals  Factors Affecting Nutritional Intake: none identified  Food/Spiritism/Cultural Preferences: none reported  Food Allergies: no known food allergies     Wound(s):  intact    Comments    24: Pt with gestational diabetes during this pregnancy. Currently 33 4/7 weeks gestation. Noted +wt gain due to fluid and increased blood pressures. Otherwise adequate control of blood sugars. Continues on insulin and  "metformin.    Anthropometrics    Height: 5' 4" (162.6 cm), Height Method: Stated  Last Weight: 98.9 kg (218 lb) (01/30/24 1645), Weight Method: Stated  BMI (Calculated): 37.4  BMI Classification: not applicable-pregnancy     Ideal Body Weight (IBW), Female: 120 lb     % Ideal Body Weight, Female (lb): 181.67 %                             Usual Weight Provided By: EMR weight history    Wt Readings from Last 5 Encounters:   01/30/24 98.9 kg (218 lb)   01/18/24 92.4 kg (203 lb 11.3 oz)   01/07/24 90.2 kg (198 lb 13.7 oz)   12/28/23 90.2 kg (198 lb 13.7 oz)   11/28/23 87.7 kg (193 lb 5.5 oz)     Weight Change(s) Since Admission: +wt gain due to fluid  Wt Readings from Last 1 Encounters:   01/30/24 1645 98.9 kg (218 lb)   Admit Weight: 98.9 kg (218 lb) (01/30/24 1645), Weight Method: Stated    Patient Education     Not applicable.    Nutrition Goals & Monitoring     Dietitian will monitor: food and beverage intake, electrolyte/renal panel, and glucose/endocrine profile    Nutrition Risk/Follow-Up: low (follow-up in 5-7 days)  Patients assigned 'low nutrition risk' status do not qualify for a full nutritional assessment but will be monitored and re-evaluated in a 5-7 day time period. Please consult if re-evaluation needed sooner.   "

## 2024-02-01 LAB
ABORH RETYPE: NORMAL
POCT GLUCOSE: 114 MG/DL (ref 70–110)
POCT GLUCOSE: 87 MG/DL (ref 70–110)
POCT GLUCOSE: 97 MG/DL (ref 70–110)
STREP B PCR (OHS): NOT DETECTED

## 2024-02-01 PROCEDURE — 25000003 PHARM REV CODE 250: Performed by: OBSTETRICS & GYNECOLOGY

## 2024-02-01 PROCEDURE — 11000001 HC ACUTE MED/SURG PRIVATE ROOM

## 2024-02-01 PROCEDURE — 99233 SBSQ HOSP IP/OBS HIGH 50: CPT | Mod: ,,, | Performed by: NURSE PRACTITIONER

## 2024-02-01 PROCEDURE — 63600175 PHARM REV CODE 636 W HCPCS: Performed by: OBSTETRICS & GYNECOLOGY

## 2024-02-01 RX ORDER — PANTOPRAZOLE SODIUM 40 MG/1
40 TABLET, DELAYED RELEASE ORAL
Status: DISCONTINUED | OUTPATIENT
Start: 2024-02-01 | End: 2024-02-01

## 2024-02-01 RX ADMIN — INSULIN DETEMIR 10 UNITS: 100 INJECTION, SOLUTION SUBCUTANEOUS at 08:02

## 2024-02-01 RX ADMIN — LABETALOL HYDROCHLORIDE 300 MG: 100 TABLET, FILM COATED ORAL at 08:02

## 2024-02-01 RX ADMIN — INSULIN DETEMIR 14 UNITS: 100 INJECTION, SOLUTION SUBCUTANEOUS at 08:02

## 2024-02-01 RX ADMIN — METFORMIN HYDROCHLORIDE 1000 MG: 500 TABLET, FILM COATED ORAL at 07:02

## 2024-02-01 RX ADMIN — METFORMIN HYDROCHLORIDE 1000 MG: 500 TABLET, FILM COATED ORAL at 05:02

## 2024-02-01 NOTE — PROGRESS NOTES
Progress Note  Maternal Fetal Medicine          Subjective:      IUP at 33w5d - pre-eclampsia with severe features, hx insulin resistance/GDM on insulin    She reports sleeping well last night. She states this is the best she's felt since being admitted earlier this week. Reports urinating 5 times throughout the night and her swelling has improved in comparison to day of admit. She specifically denies vaginal leaking/bleeding, abd pain/contractions, headaches, visual disturbances, RUQ/epigastric pain, SOB, CP. Reports positive fetal movement.    Has not worn SCDs since yesterday after shower.     Nursing staff without concerns.     Objective:         Temp:  [98 °F (36.7 °C)-99.1 °F (37.3 °C)] 98.4 °F (36.9 °C)  Pulse:  [0-109] 73  Resp:  [16-20] 18  SpO2:  [94 %-100 %] 99 %  BP: (111-154)/(68-94) 135/85    Gen: NAD, A&Ox3  Pulm: Unlabored breathing, LCTAB  Card: RRR  Abd: FHT present, soft, nondistended, nontender to palpation, gravid uterus palpable c/w gestational age  Extremities: Palpable peripheral pulses, 3+ pitting edema to bilat LE, 3+ DTRs x 4, 2 beat clonus bilat feet    NST (last PM; due this AM): 125 baseline, moderate BTBV, pos accelerations, neg decelerations  [place]: none  US: vertex presentation, Fundal placenta, GOLDY 8.5cm     Lab Review  Blood Type: O POS    Recent Results (from the past 24 hour(s))   POCT glucose    Collection Time: 24  3:12 PM   Result Value Ref Range    POCT Glucose 97 70 - 110 mg/dL   ABORH RETYPE    Collection Time: 24 11:32 PM   Result Value Ref Range    ABORH Retype O POS    POCT glucose    Collection Time: 24  7:52 AM   Result Value Ref Range    POCT Glucose 87 70 - 110 mg/dL   POCT glucose    Collection Time: 24  9:56 AM   Result Value Ref Range    POCT Glucose 114 (H) 70 - 110 mg/dL       Assessment:       33 y.o.  at 33w5d weeks gestation admitted for pre-eclampsia with severe features, GDM on insulin    Active Hospital Problems    Diagnosis   POA    *Severe preeclampsia, third trimester [O14.13]  Unknown      Resolved Hospital Problems   No resolved problems to display.        Plan:     1. Severe pre-eclampsia  I counseled the patient regarding the suspected diagnosis. Other causes of elevated blood pressure and proteinuria include chronic renal disease, autoimmune disorders, chronic hypertension, and diabetes. Additionally, certain urinary tract pathogens can cause proteinuria, however, are unlikely to be associated with hypertension unless prolonged kidney disease is present. I reviewed the diagnosis of preeclampsia, the potential course of this disease, maternal-fetal complications, the lack of any available therapy to prevent progression, the likelihood of resolution post-delivery, and the potential for recurrence in a subsequent pregnancy. I counseled the patient that one of the major fetal considerations in a pregnancy complicated by preeclampsia are those that are related to prematurity. With the presence of severely elevated blood pressures since admit (>4h apart) and proteinuria, she meets diagnostic criteria for severe pre-eclampsia.   Recommendations:  Inpatient management with planned delivery at 34 weeks if maternal and fetal status allows  NST 1hr twice daily. If she experiences severe range Bps, neuro symptoms, or decreased FM/VB/LOF, resume continuous monitoring.  Serial blood pressure assessments (at least every 4 hours) while admitted.   IV Labetalol protocol PRN severe range Bps  Currently on Labetalol 300mg BID. Titrate to keep Bps <140/90. If severe again use IV algorithm and move forward with delivery.  Finish 24hr urine collection.  Obtain CBC, CMP at completion of 24hr urine and repeat Q3days unless new symptoms arise.  If 24hr urine >3000mg, start prophylactic Lovenox for increased risk of VTE in setting of nephrotic range proteinuria.   If 24hr urine <3000mg, SCDs are sufficient DVT prophylaxis. Encouraged compliance.  Daily  weights  ANCS 1/30-31  She is s/p magnesium sulfate. If uncontrolled severe range Bps, lab abnormalities, or neuro symptoms become apparent, restart magnesium for seizure prophylaxis. Post delivery, recommend 24hrs of Mag.  NICU consult     2. Routine prenatal care  -Blood type/Ab screen   - GBS neg       3. GDM  - Continue metformin and insulin outpatient regimen: Regimen: Metformin 1000mg BID; Levemir 10u QAM/14u QHS   - Order SSI now as she is getting steroids  - Follow routine management for postpartum- should be able to stop all meds      Thank you for allowing us to participate in the care of your patient. If you have any questions/concerns, please do not hesitate to contact us.     Pati Baker, MSN, APRN, WHNP-BC  Maternal Fetal Medicine

## 2024-02-01 NOTE — PROGRESS NOTES
[facility] - Antepartum  Obstetrics  Antepartum Progress Note    Patient Name: Rosemarie Good  MRN: [id number]  Admission Date: 2024  Hospital Length of Stay: 2 days  Attending Physician: Paola Hale MD  Primary Care Provider: Elodia, Primary Doctor    Subjective:     Principal Problem:Severe preeclampsia, third trimester    HPI: Rosemarie Good is a 33 y.o.  female with IUP at 33w5d weeks gestation who presents to L&D for severe range BP in office. She was diagnosed with GHTN at office visit last week.  She presented to office on Tuesday with severe range pressures, complaints of HA, increasing swelling, and rapid weight gain of 9 lbs in 7 days. Pregnancy complicated by diabetes and fibromyalgia.  [facility] has also been seen by Dr. Marcano.     Obstetric HPI:  Patient reports None contractions, active fetal movement, absent vaginal bleeding , absent loss of fluid      Objective:     Vital Signs (Most Recent):  Temp: 98.2 °F (36.8 °C) (24)  Pulse: 81 (24)  Resp: 18 (24)  BP: (!) 140/91 (24)  SpO2: 99 % (24) Vital Signs (24h Range):  Temp:  [98 °F (36.7 °C)-99.1 °F (37.3 °C)] 98.2 °F (36.8 °C)  Pulse:  [0-109] 81  Resp:  [16-20] 18  SpO2:  [94 %-100 %] 99 %  BP: (111-162)/() 140/91     Weight: 98.9 kg (218 lb)  Body mass index is 37.42 kg/m².    + FHT    Intake/Output Summary (Last 24 hours) at 2024 0802  Last data filed at 2024 1836  Gross per 24 hour   Intake 1250 ml   Output 3040 ml   Net -1790 ml         Physical Exam  DTRs normal  Negative for clonus  Clear, regular breath sounds  CV regular rate and rhythm   Appropriate urine output    Significant Labs:  Recent Lab Results         24  0752   24  2332   24  1512   01/31/24  1030        ABO and RH   O POS           POCT Glucose 87     97   100               Assessment/Plan:     33  y.o. female  at 33w5d for:    Active Diagnoses:    Diagnosis Date Noted POA    PRINCIPAL PROBLEM:  Severe preeclampsia, third trimester [O14.13] 2024 Unknown      Problems Resolved During this Admission:       Plan:  Continue oral labetalol 300mg BID since no longer severe range  BMZ x2 received. Last dose 1800 on .  Mag sulfate 24 hour completion at 1750 on .  Taking home metformin 1000mg BID & Levemir 10u qam & 14u qHS  CBG fasting and 2h PP  GBS collected and pending  Long discussion with Rosemarie regarding new diagnosis of severe preeclampsia with goal of delivery at 34 wga.  We discussed that if maternal or fetal status worsen, that earlier delivery may be indicated.  Discussed case with Dr. Marcano and Dr. Hale.  NICU consult completed  and she is going tour today at .      SHAGUFTA RAMIREZ NP  Obstetrics  [facility] - [facility]te[facility]

## 2024-02-02 ENCOUNTER — ANESTHESIA (OUTPATIENT)
Dept: OBSTETRICS AND GYNECOLOGY | Facility: HOSPITAL | Age: 34
End: 2024-02-02
Payer: COMMERCIAL

## 2024-02-02 ENCOUNTER — ANESTHESIA EVENT (OUTPATIENT)
Dept: OBSTETRICS AND GYNECOLOGY | Facility: HOSPITAL | Age: 34
End: 2024-02-02
Payer: COMMERCIAL

## 2024-02-02 LAB
BASOPHILS # BLD AUTO: 0.04 X10(3)/MCL
BASOPHILS NFR BLD AUTO: 0.3 %
C TRACH DNA SPEC QL NAA+PROBE: NOT DETECTED
EOSINOPHIL # BLD AUTO: 0.01 X10(3)/MCL (ref 0–0.9)
EOSINOPHIL NFR BLD AUTO: 0.1 %
ERYTHROCYTE [DISTWIDTH] IN BLOOD BY AUTOMATED COUNT: 16.7 % (ref 11.5–17)
GROUP & RH: NORMAL
HCT VFR BLD AUTO: 37.7 % (ref 37–47)
HGB BLD-MCNC: 11.9 G/DL (ref 12–16)
IMM GRANULOCYTES # BLD AUTO: 0.36 X10(3)/MCL (ref 0–0.04)
IMM GRANULOCYTES NFR BLD AUTO: 2.6 %
INDIRECT COOMBS: NORMAL
LYMPHOCYTES # BLD AUTO: 2.45 X10(3)/MCL (ref 0.6–4.6)
LYMPHOCYTES NFR BLD AUTO: 17.5 %
MCH RBC QN AUTO: 29 PG (ref 27–31)
MCHC RBC AUTO-ENTMCNC: 31.6 G/DL (ref 33–36)
MCV RBC AUTO: 92 FL (ref 80–94)
MONOCYTES # BLD AUTO: 1.36 X10(3)/MCL (ref 0.1–1.3)
MONOCYTES NFR BLD AUTO: 9.7 %
N GONORRHOEA DNA SPEC QL NAA+PROBE: NOT DETECTED
NEUTROPHILS # BLD AUTO: 9.76 X10(3)/MCL (ref 2.1–9.2)
NEUTROPHILS NFR BLD AUTO: 69.8 %
NRBC BLD AUTO-RTO: 0.5 %
PLATELET # BLD AUTO: 253 X10(3)/MCL (ref 130–400)
PMV BLD AUTO: 10.6 FL (ref 7.4–10.4)
POCT GLUCOSE: 77 MG/DL (ref 70–110)
POCT GLUCOSE: 86 MG/DL (ref 70–110)
POCT GLUCOSE: 86 MG/DL (ref 70–110)
POCT GLUCOSE: 87 MG/DL (ref 70–110)
POCT GLUCOSE: 96 MG/DL (ref 70–110)
RBC # BLD AUTO: 4.1 X10(6)/MCL (ref 4.2–5.4)
SOURCE (OHS): NORMAL
SPECIMEN OUTDATE: NORMAL
T PALLIDUM AB SER QL: NONREACTIVE
WBC # SPEC AUTO: 13.98 X10(3)/MCL (ref 4.5–11.5)

## 2024-02-02 PROCEDURE — 59514 CESAREAN DELIVERY ONLY: CPT | Mod: QX,CRNA,,

## 2024-02-02 PROCEDURE — 25000003 PHARM REV CODE 250: Performed by: NURSE PRACTITIONER

## 2024-02-02 PROCEDURE — 86901 BLOOD TYPING SEROLOGIC RH(D): CPT | Performed by: OBSTETRICS & GYNECOLOGY

## 2024-02-02 PROCEDURE — 25000003 PHARM REV CODE 250: Performed by: OBSTETRICS & GYNECOLOGY

## 2024-02-02 PROCEDURE — 36004724 HC OB OR TIME LEV III - 1ST 15 MIN: Performed by: OBSTETRICS & GYNECOLOGY

## 2024-02-02 PROCEDURE — 63600175 PHARM REV CODE 636 W HCPCS: Mod: JZ,JG | Performed by: ANESTHESIOLOGY

## 2024-02-02 PROCEDURE — 71000039 HC RECOVERY, EACH ADD'L HOUR: Performed by: OBSTETRICS & GYNECOLOGY

## 2024-02-02 PROCEDURE — 63600175 PHARM REV CODE 636 W HCPCS: Performed by: OBSTETRICS & GYNECOLOGY

## 2024-02-02 PROCEDURE — 63600175 PHARM REV CODE 636 W HCPCS

## 2024-02-02 PROCEDURE — 87491 CHLMYD TRACH DNA AMP PROBE: CPT | Performed by: OBSTETRICS & GYNECOLOGY

## 2024-02-02 PROCEDURE — 51702 INSERT TEMP BLADDER CATH: CPT

## 2024-02-02 PROCEDURE — 63600175 PHARM REV CODE 636 W HCPCS: Mod: JZ,JG | Performed by: OBSTETRICS & GYNECOLOGY

## 2024-02-02 PROCEDURE — 62322 NJX INTERLAMINAR LMBR/SAC: CPT

## 2024-02-02 PROCEDURE — 36004725 HC OB OR TIME LEV III - EA ADD 15 MIN: Performed by: OBSTETRICS & GYNECOLOGY

## 2024-02-02 PROCEDURE — 25000003 PHARM REV CODE 250

## 2024-02-02 PROCEDURE — 86780 TREPONEMA PALLIDUM: CPT | Performed by: OBSTETRICS & GYNECOLOGY

## 2024-02-02 PROCEDURE — 99233 SBSQ HOSP IP/OBS HIGH 50: CPT | Mod: ,,, | Performed by: NURSE PRACTITIONER

## 2024-02-02 PROCEDURE — 59514 CESAREAN DELIVERY ONLY: CPT | Mod: QY,ANES,, | Performed by: ANESTHESIOLOGY

## 2024-02-02 PROCEDURE — 11000001 HC ACUTE MED/SURG PRIVATE ROOM

## 2024-02-02 PROCEDURE — 63600175 PHARM REV CODE 636 W HCPCS: Performed by: NURSE PRACTITIONER

## 2024-02-02 PROCEDURE — 27000492 HC SLEEVE, SCD T/L

## 2024-02-02 PROCEDURE — 37000008 HC ANESTHESIA 1ST 15 MINUTES: Performed by: OBSTETRICS & GYNECOLOGY

## 2024-02-02 PROCEDURE — 71000033 HC RECOVERY, INTIAL HOUR: Performed by: OBSTETRICS & GYNECOLOGY

## 2024-02-02 PROCEDURE — 37000009 HC ANESTHESIA EA ADD 15 MINS: Performed by: OBSTETRICS & GYNECOLOGY

## 2024-02-02 PROCEDURE — 85025 COMPLETE CBC W/AUTO DIFF WBC: CPT | Performed by: OBSTETRICS & GYNECOLOGY

## 2024-02-02 RX ORDER — FAMOTIDINE 10 MG/ML
20 INJECTION INTRAVENOUS ONCE
Status: DISCONTINUED | OUTPATIENT
Start: 2024-02-02 | End: 2024-02-06 | Stop reason: HOSPADM

## 2024-02-02 RX ORDER — NALBUPHINE HYDROCHLORIDE 10 MG/ML
2.5 INJECTION, SOLUTION INTRAMUSCULAR; INTRAVENOUS; SUBCUTANEOUS ONCE
Status: DISCONTINUED | OUTPATIENT
Start: 2024-02-02 | End: 2024-02-06 | Stop reason: HOSPADM

## 2024-02-02 RX ORDER — MAGNESIUM SULFATE HEPTAHYDRATE 40 MG/ML
2 INJECTION, SOLUTION INTRAVENOUS CONTINUOUS
Status: DISCONTINUED | OUTPATIENT
Start: 2024-02-02 | End: 2024-02-06 | Stop reason: HOSPADM

## 2024-02-02 RX ORDER — ONDANSETRON HYDROCHLORIDE 2 MG/ML
INJECTION, SOLUTION INTRAVENOUS
Status: DISCONTINUED | OUTPATIENT
Start: 2024-02-02 | End: 2024-02-02

## 2024-02-02 RX ORDER — SODIUM CITRATE AND CITRIC ACID MONOHYDRATE 334; 500 MG/5ML; MG/5ML
30 SOLUTION ORAL
Status: DISCONTINUED | OUTPATIENT
Start: 2024-02-02 | End: 2024-02-02

## 2024-02-02 RX ORDER — HYDROXYZINE PAMOATE 25 MG/1
25 CAPSULE ORAL EVERY 8 HOURS PRN
Status: DISCONTINUED | OUTPATIENT
Start: 2024-02-02 | End: 2024-02-06 | Stop reason: HOSPADM

## 2024-02-02 RX ORDER — SODIUM CHLORIDE, SODIUM LACTATE, POTASSIUM CHLORIDE, CALCIUM CHLORIDE 600; 310; 30; 20 MG/100ML; MG/100ML; MG/100ML; MG/100ML
1000 INJECTION, SOLUTION INTRAVENOUS CONTINUOUS
Status: DISCONTINUED | OUTPATIENT
Start: 2024-02-02 | End: 2024-02-06 | Stop reason: HOSPADM

## 2024-02-02 RX ORDER — CALCIUM GLUCONATE 98 MG/ML
1 INJECTION, SOLUTION INTRAVENOUS
Status: DISCONTINUED | OUTPATIENT
Start: 2024-02-02 | End: 2024-02-06 | Stop reason: HOSPADM

## 2024-02-02 RX ORDER — PROCHLORPERAZINE EDISYLATE 5 MG/ML
5 INJECTION INTRAMUSCULAR; INTRAVENOUS EVERY 6 HOURS PRN
Status: DISCONTINUED | OUTPATIENT
Start: 2024-02-03 | End: 2024-02-06 | Stop reason: HOSPADM

## 2024-02-02 RX ORDER — ONDANSETRON 4 MG/1
8 TABLET, ORALLY DISINTEGRATING ORAL EVERY 8 HOURS PRN
Status: DISCONTINUED | OUTPATIENT
Start: 2024-02-03 | End: 2024-02-06 | Stop reason: HOSPADM

## 2024-02-02 RX ORDER — OXYTOCIN 10 [USP'U]/ML
10 INJECTION, SOLUTION INTRAMUSCULAR; INTRAVENOUS ONCE AS NEEDED
Status: DISCONTINUED | OUTPATIENT
Start: 2024-02-03 | End: 2024-02-06 | Stop reason: HOSPADM

## 2024-02-02 RX ORDER — DIPHENOXYLATE HYDROCHLORIDE AND ATROPINE SULFATE 2.5; .025 MG/1; MG/1
2 TABLET ORAL EVERY 6 HOURS PRN
Status: DISCONTINUED | OUTPATIENT
Start: 2024-02-03 | End: 2024-02-06 | Stop reason: HOSPADM

## 2024-02-02 RX ORDER — NALOXONE HCL 0.4 MG/ML
0.4 VIAL (ML) INJECTION SEE ADMIN INSTRUCTIONS
Status: DISCONTINUED | OUTPATIENT
Start: 2024-02-02 | End: 2024-02-06 | Stop reason: HOSPADM

## 2024-02-02 RX ORDER — MORPHINE SULFATE 4 MG/ML
4 INJECTION, SOLUTION INTRAMUSCULAR; INTRAVENOUS EVERY 4 HOURS PRN
Status: DISCONTINUED | OUTPATIENT
Start: 2024-02-03 | End: 2024-02-06 | Stop reason: HOSPADM

## 2024-02-02 RX ORDER — HYDRALAZINE HYDROCHLORIDE 20 MG/ML
10 INJECTION INTRAMUSCULAR; INTRAVENOUS ONCE AS NEEDED
Status: DISCONTINUED | OUTPATIENT
Start: 2024-02-02 | End: 2024-02-06 | Stop reason: HOSPADM

## 2024-02-02 RX ORDER — EPHEDRINE SULFATE 50 MG/ML
10 INJECTION, SOLUTION INTRAVENOUS ONCE AS NEEDED
Status: DISCONTINUED | OUTPATIENT
Start: 2024-02-02 | End: 2024-02-06 | Stop reason: HOSPADM

## 2024-02-02 RX ORDER — FAMOTIDINE 10 MG/ML
20 INJECTION INTRAVENOUS
Status: DISCONTINUED | OUTPATIENT
Start: 2024-02-02 | End: 2024-02-02

## 2024-02-02 RX ORDER — METHYLERGONOVINE MALEATE 0.2 MG/ML
200 INJECTION INTRAVENOUS ONCE AS NEEDED
Status: DISCONTINUED | OUTPATIENT
Start: 2024-02-03 | End: 2024-02-06 | Stop reason: HOSPADM

## 2024-02-02 RX ORDER — CEFAZOLIN SODIUM 2 G/50ML
2 SOLUTION INTRAVENOUS
Status: DISCONTINUED | OUTPATIENT
Start: 2024-02-02 | End: 2024-02-02

## 2024-02-02 RX ORDER — METHYLERGONOVINE MALEATE 0.2 MG/ML
200 INJECTION INTRAVENOUS
Status: DISCONTINUED | OUTPATIENT
Start: 2024-02-02 | End: 2024-02-02

## 2024-02-02 RX ORDER — MAGNESIUM SULFATE HEPTAHYDRATE 40 MG/ML
4 INJECTION, SOLUTION INTRAVENOUS ONCE
Status: COMPLETED | OUTPATIENT
Start: 2024-02-02 | End: 2024-02-02

## 2024-02-02 RX ORDER — CARBOPROST TROMETHAMINE 250 UG/ML
250 INJECTION, SOLUTION INTRAMUSCULAR
Status: DISCONTINUED | OUTPATIENT
Start: 2024-02-02 | End: 2024-02-02

## 2024-02-02 RX ORDER — PHENYLEPHRINE HCL IN 0.9% NACL 1 MG/10 ML
SYRINGE (ML) INTRAVENOUS
Status: DISCONTINUED | OUTPATIENT
Start: 2024-02-02 | End: 2024-02-02

## 2024-02-02 RX ORDER — MUPIROCIN 20 MG/G
OINTMENT TOPICAL
Status: DISCONTINUED | OUTPATIENT
Start: 2024-02-02 | End: 2024-02-02

## 2024-02-02 RX ORDER — OXYTOCIN/RINGER'S LACTATE 30/500 ML
95 PLASTIC BAG, INJECTION (ML) INTRAVENOUS ONCE
Status: DISCONTINUED | OUTPATIENT
Start: 2024-02-02 | End: 2024-02-02

## 2024-02-02 RX ORDER — OXYTOCIN/RINGER'S LACTATE 30/500 ML
334 PLASTIC BAG, INJECTION (ML) INTRAVENOUS ONCE AS NEEDED
Status: DISCONTINUED | OUTPATIENT
Start: 2024-02-02 | End: 2024-02-06 | Stop reason: HOSPADM

## 2024-02-02 RX ORDER — OXYTOCIN/RINGER'S LACTATE 30/500 ML
PLASTIC BAG, INJECTION (ML) INTRAVENOUS
Status: DISCONTINUED | OUTPATIENT
Start: 2024-02-02 | End: 2024-02-02

## 2024-02-02 RX ORDER — SODIUM CHLORIDE, SODIUM LACTATE, POTASSIUM CHLORIDE, CALCIUM CHLORIDE 600; 310; 30; 20 MG/100ML; MG/100ML; MG/100ML; MG/100ML
INJECTION, SOLUTION INTRAVENOUS CONTINUOUS PRN
Status: DISCONTINUED | OUTPATIENT
Start: 2024-02-02 | End: 2024-02-02

## 2024-02-02 RX ORDER — MISOPROSTOL 100 UG/1
800 TABLET ORAL
Status: DISCONTINUED | OUTPATIENT
Start: 2024-02-02 | End: 2024-02-02

## 2024-02-02 RX ORDER — CARBOPROST TROMETHAMINE 250 UG/ML
250 INJECTION, SOLUTION INTRAMUSCULAR
Status: DISCONTINUED | OUTPATIENT
Start: 2024-02-03 | End: 2024-02-06 | Stop reason: HOSPADM

## 2024-02-02 RX ORDER — DIPHENHYDRAMINE HYDROCHLORIDE 50 MG/ML
25 INJECTION INTRAMUSCULAR; INTRAVENOUS EVERY 6 HOURS PRN
Status: DISCONTINUED | OUTPATIENT
Start: 2024-02-03 | End: 2024-02-06 | Stop reason: HOSPADM

## 2024-02-02 RX ORDER — SODIUM CITRATE AND CITRIC ACID MONOHYDRATE 334; 500 MG/5ML; MG/5ML
30 SOLUTION ORAL ONCE
Status: DISCONTINUED | OUTPATIENT
Start: 2024-02-02 | End: 2024-02-06 | Stop reason: HOSPADM

## 2024-02-02 RX ORDER — OXYTOCIN/RINGER'S LACTATE 30/500 ML
95 PLASTIC BAG, INJECTION (ML) INTRAVENOUS ONCE AS NEEDED
Status: DISCONTINUED | OUTPATIENT
Start: 2024-02-02 | End: 2024-02-06 | Stop reason: HOSPADM

## 2024-02-02 RX ORDER — DIPHENHYDRAMINE HYDROCHLORIDE 50 MG/ML
INJECTION INTRAMUSCULAR; INTRAVENOUS
Status: COMPLETED
Start: 2024-02-02 | End: 2024-02-02

## 2024-02-02 RX ORDER — FENTANYL CITRATE 50 UG/ML
INJECTION, SOLUTION INTRAMUSCULAR; INTRAVENOUS
Status: DISCONTINUED | OUTPATIENT
Start: 2024-02-02 | End: 2024-02-02

## 2024-02-02 RX ORDER — MISOPROSTOL 100 UG/1
800 TABLET ORAL ONCE AS NEEDED
Status: DISCONTINUED | OUTPATIENT
Start: 2024-02-02 | End: 2024-02-06 | Stop reason: HOSPADM

## 2024-02-02 RX ORDER — MORPHINE SULFATE 1 MG/ML
INJECTION, SOLUTION EPIDURAL; INTRATHECAL; INTRAVENOUS
Status: DISCONTINUED | OUTPATIENT
Start: 2024-02-02 | End: 2024-02-02

## 2024-02-02 RX ORDER — OXYTOCIN/RINGER'S LACTATE 30/500 ML
334 PLASTIC BAG, INJECTION (ML) INTRAVENOUS ONCE
Status: DISCONTINUED | OUTPATIENT
Start: 2024-02-02 | End: 2024-02-02

## 2024-02-02 RX ORDER — LABETALOL HCL 20 MG/4 ML
80 SYRINGE (ML) INTRAVENOUS ONCE AS NEEDED
Status: DISCONTINUED | OUTPATIENT
Start: 2024-02-02 | End: 2024-02-06 | Stop reason: HOSPADM

## 2024-02-02 RX ORDER — ACETAMINOPHEN 10 MG/ML
INJECTION, SOLUTION INTRAVENOUS
Status: DISCONTINUED | OUTPATIENT
Start: 2024-02-02 | End: 2024-02-02

## 2024-02-02 RX ORDER — LABETALOL HCL 20 MG/4 ML
40 SYRINGE (ML) INTRAVENOUS ONCE AS NEEDED
Status: COMPLETED | OUTPATIENT
Start: 2024-02-02 | End: 2024-02-02

## 2024-02-02 RX ORDER — LABETALOL HCL 20 MG/4 ML
20 SYRINGE (ML) INTRAVENOUS ONCE AS NEEDED
Status: COMPLETED | OUTPATIENT
Start: 2024-02-02 | End: 2024-02-02

## 2024-02-02 RX ORDER — KETOROLAC TROMETHAMINE 30 MG/ML
INJECTION, SOLUTION INTRAMUSCULAR; INTRAVENOUS
Status: DISCONTINUED | OUTPATIENT
Start: 2024-02-02 | End: 2024-02-02

## 2024-02-02 RX ORDER — CEFAZOLIN SODIUM 1 G/3ML
INJECTION, POWDER, FOR SOLUTION INTRAMUSCULAR; INTRAVENOUS
Status: DISCONTINUED | OUTPATIENT
Start: 2024-02-02 | End: 2024-02-02

## 2024-02-02 RX ORDER — BUPIVACAINE HYDROCHLORIDE 7.5 MG/ML
INJECTION, SOLUTION EPIDURAL; RETROBULBAR
Status: COMPLETED | OUTPATIENT
Start: 2024-02-02 | End: 2024-02-02

## 2024-02-02 RX ORDER — MISOPROSTOL 100 UG/1
800 TABLET ORAL ONCE AS NEEDED
Status: DISCONTINUED | OUTPATIENT
Start: 2024-02-03 | End: 2024-02-06 | Stop reason: HOSPADM

## 2024-02-02 RX ADMIN — ACETAMINOPHEN 1000 MG: 10 INJECTION, SOLUTION INTRAVENOUS at 09:02

## 2024-02-02 RX ADMIN — KETOROLAC TROMETHAMINE 30 MG: 30 INJECTION, SOLUTION INTRAMUSCULAR; INTRAVENOUS at 09:02

## 2024-02-02 RX ADMIN — BUPIVACAINE HYDROCHLORIDE 1.7 ML: 7.5 INJECTION, SOLUTION EPIDURAL; RETROBULBAR at 08:02

## 2024-02-02 RX ADMIN — METFORMIN HYDROCHLORIDE 1000 MG: 500 TABLET, FILM COATED ORAL at 08:02

## 2024-02-02 RX ADMIN — DIPHENHYDRAMINE HYDROCHLORIDE 25 MG: 50 INJECTION INTRAMUSCULAR; INTRAVENOUS at 11:02

## 2024-02-02 RX ADMIN — SODIUM CHLORIDE, POTASSIUM CHLORIDE, SODIUM LACTATE AND CALCIUM CHLORIDE 50 ML/HR: 600; 310; 30; 20 INJECTION, SOLUTION INTRAVENOUS at 01:02

## 2024-02-02 RX ADMIN — CEFAZOLIN 2 G: 330 INJECTION, POWDER, FOR SOLUTION INTRAMUSCULAR; INTRAVENOUS at 08:02

## 2024-02-02 RX ADMIN — LABETALOL HYDROCHLORIDE 20 MG: 5 INJECTION, SOLUTION INTRAVENOUS at 11:02

## 2024-02-02 RX ADMIN — Medication 100 MCG: at 08:02

## 2024-02-02 RX ADMIN — LABETALOL HYDROCHLORIDE 80 MG: 5 INJECTION, SOLUTION INTRAVENOUS at 01:02

## 2024-02-02 RX ADMIN — MORPHINE SULFATE 0.1 MG: 1 INJECTION, SOLUTION EPIDURAL; INTRATHECAL; INTRAVENOUS at 08:02

## 2024-02-02 RX ADMIN — INSULIN DETEMIR 10 UNITS: 100 INJECTION, SOLUTION SUBCUTANEOUS at 08:02

## 2024-02-02 RX ADMIN — MAGNESIUM SULFATE HEPTAHYDRATE 4 G: 40 INJECTION, SOLUTION INTRAVENOUS at 01:02

## 2024-02-02 RX ADMIN — LABETALOL HYDROCHLORIDE 40 MG: 5 INJECTION, SOLUTION INTRAVENOUS at 12:02

## 2024-02-02 RX ADMIN — FENTANYL CITRATE 10 MCG: 50 INJECTION, SOLUTION INTRAMUSCULAR; INTRAVENOUS at 08:02

## 2024-02-02 RX ADMIN — ONDANSETRON 8 MG: 2 INJECTION INTRAMUSCULAR; INTRAVENOUS at 08:02

## 2024-02-02 RX ADMIN — Medication 100 MCG: at 09:02

## 2024-02-02 RX ADMIN — SODIUM CHLORIDE, POTASSIUM CHLORIDE, SODIUM LACTATE AND CALCIUM CHLORIDE: 600; 310; 30; 20 INJECTION, SOLUTION INTRAVENOUS at 08:02

## 2024-02-02 RX ADMIN — Medication 500 ML: at 09:02

## 2024-02-02 RX ADMIN — LABETALOL HYDROCHLORIDE 300 MG: 100 TABLET, FILM COATED ORAL at 08:02

## 2024-02-02 RX ADMIN — LABETALOL HYDROCHLORIDE 40 MG: 5 INJECTION, SOLUTION INTRAVENOUS at 11:02

## 2024-02-02 RX ADMIN — MAGNESIUM SULFATE HEPTAHYDRATE 2 G/HR: 40 INJECTION, SOLUTION INTRAVENOUS at 01:02

## 2024-02-02 RX ADMIN — SODIUM CITRATE AND CITRIC ACID MONOHYDRATE 30 ML: 500; 334 SOLUTION ORAL at 07:02

## 2024-02-02 RX ADMIN — LABETALOL HYDROCHLORIDE 20 MG: 5 INJECTION, SOLUTION INTRAVENOUS at 12:02

## 2024-02-02 NOTE — PROGRESS NOTES
PROGRESS NOTE  ANTEPARTUM    Admit Date: 2024   LOS: 3 days     Reason for Admission:  Severe preeclampsia, third trimester    SUBJECTIVE:     Rosemarie Good is a 33 y.o. female at 33w6d who is here for  severe preeclampsia   Blood pressure increasing  Patient reports None contractions, active fetal movement, No vaginal bleeding , No loss of fluid    Scheduled Meds:   insulin detemir U-100  10 Units Subcutaneous Daily    insulin detemir U-100  14 Units Subcutaneous QHS    labetaloL  300 mg Oral Q12H    magnesium sulfate in water  4 g Intravenous Once    metFORMIN  1,000 mg Oral BID WM     Continuous Infusions:   lactated ringers Stopped (24)    lactated ringers      magnesium sulfate in water Stopped (24)    magnesium sulfate in water       PRN Meds:acetaminophen, calcium gluconate 1 g, calcium gluconate, hydrALAZINE, labetalol    Review of patient's allergies indicates:  No Known Allergies    OBJECTIVE:     Vital Signs (Most Recent)  Temp: 98 °F (36.7 °C) (24 1155)  Pulse: 68 (24 1200)  Resp: 18 (24)  BP: (!) 179/105 (24 1200)  SpO2: 97 % (24)    Temperature Range Min/Max (Last 24H):  Temp:  [98 °F (36.7 °C)-98.7 °F (37.1 °C)]     Vital Signs Range (Last 24H):  Temp:  [98 °F (36.7 °C)-98.7 °F (37.1 °C)]   Pulse:  [67-82]   Resp:  [18]   BP: (129-180)/()   SpO2:  [97 %]     I & O (Last 24H):No intake or output data in the 24 hours ending 24 1219        ASSESSMENT/PLAN:     Active Hospital Problems    Diagnosis  POA    *Severe preeclampsia, third trimester [O14.13]  Unknown      Resolved Hospital Problems   No resolved problems to display.       Assessment:   33 y.o.female  at 33w6d admitted for preeclampsia    Plan:  Begin induction of labor re-initiate magnesium and labetalol protocol  Discussed with Maternal-Fetal Medicine   Reviewed plan of care with the patient she was given opportunity to ask questions      This  note was created with the assistance of [employer] voice recognition software. There may be transcription errors as a result of using this technology however minimal. Effort has been made to assure accuracy of transcription but any obvious errors or omissions should be clarified with the author of the document.       Was called back into the room to discuss proceeding to  section versus induction.  Reviewed risks and benefits patient and family member was given opportunity to ask questions.  Blood pressure better controlled with magnesium and antihypertensive.  Patient awaiting father of the baby for conversation and decision            Patient decided on proceeding for primary  section per her request.  Explained the elective nature of the procedure and that anesthesia would require for NPO status.  Again reviewed risks and benefits of primary  section versus induction of labor.  We will plan  section in 8 hours unless blood pressure not well controlled and or nonreassuring fetal heart rate.  Patient was given opportunity to ask questions and father of the baby was present for the conversation

## 2024-02-02 NOTE — PROGRESS NOTES
Progress Note  Maternal Fetal Medicine          Subjective:      IUP at 33w6d= pre-eclampsia with severe features, GDM on insulin    She reports sleeping well last night and is without complaints. She specifically denies vaginal leaking/bleeding, abd pain/contractions, headaches, visual disturbances, RUQ/epigastric pain.     Nursing staff without concerns.     Objective:         Temp:  [98 °F (36.7 °C)-98.7 °F (37.1 °C)] 98 °F (36.7 °C)  Pulse:  [73-82] 80  Resp:  [18] 18  SpO2:  [97 %] 97 %  BP: (130-157)/(74-92) 142/91    Gen: NAD, A&Ox3  Pulm: Unlabored breathing, LCTAB  Card: RRR  Abd: FHT present, soft, nondistended, nontender to palpation, gravid uterus palpable c/w gestational age  Extremities: Palpable peripheral pulses, 3+ pitting edema to bilat LE, 3+ DTRs x 4, 2 beat clonus to bilat feet (more pronounced on the left)    NST (last PM; due this AM): 140 baseline, moderate BTBV, pos accelerations, neg decelerations  [place]: none  US:  vertex presentation, Fundal placenta, GOLDY 8.5cm     Lab Review  Blood Type: O POS    Recent Results (from the past 24 hour(s))   POCT glucose    Collection Time: 24  9:56 AM   Result Value Ref Range    POCT Glucose 114 (H) 70 - 110 mg/dL   POCT glucose    Collection Time: 24  2:06 PM   Result Value Ref Range    POCT Glucose 97 70 - 110 mg/dL   POCT glucose    Collection Time: 24  8:21 PM   Result Value Ref Range    POCT Glucose 86 70 - 110 mg/dL   POCT glucose    Collection Time: 24  6:26 AM   Result Value Ref Range    POCT Glucose 77 70 - 110 mg/dL       Assessment:       33 y.o.  at 33w6d weeks gestation admitted for pre-eclampsia with severe features, GDM on insulin     Active Hospital Problems    Diagnosis  POA    *Severe preeclampsia, third trimester [O14.13]  Unknown      Resolved Hospital Problems   No resolved problems to display.        Plan:     1. Severe pre-eclampsia  I counseled the patient regarding the suspected diagnosis. Other  causes of elevated blood pressure and proteinuria include chronic renal disease, autoimmune disorders, chronic hypertension, and diabetes. Additionally, certain urinary tract pathogens can cause proteinuria, however, are unlikely to be associated with hypertension unless prolonged kidney disease is present. I reviewed the diagnosis of preeclampsia, the potential course of this disease, maternal-fetal complications, the lack of any available therapy to prevent progression, the likelihood of resolution post-delivery, and the potential for recurrence in a subsequent pregnancy. I counseled the patient that one of the major fetal considerations in a pregnancy complicated by preeclampsia are those that are related to prematurity. With the presence of severely elevated blood pressures since admit (>4h apart) and proteinuria, she meets diagnostic criteria for severe pre-eclampsia.   Recommendations:  Inpatient management with planned delivery at 34 weeks if maternal and fetal status allows - planning IOL Sunday PM  NST 1hr twice daily. If she experiences severe range Bps, neuro symptoms, or decreased FM/VB/LOF, resume continuous monitoring.  Serial blood pressure assessments (at least every 4 hours) while admitted.   IV Labetalol protocol PRN severe range Bps  Currently on Labetalol 300mg BID. Titrate to keep Bps <140/90. If severe again use IV algorithm and move forward with delivery.  Finish 24hr urine collection.  Obtain CBC, CMP at completion of 24hr urine and repeat Q3days unless new symptoms arise.  If 24hr urine >3000mg, start prophylactic Lovenox for increased risk of VTE in setting of nephrotic range proteinuria.   If 24hr urine <3000mg, SCDs are sufficient DVT prophylaxis. Encouraged compliance.  Daily weights  ANCS 1/30-31  She is s/p magnesium sulfate. If uncontrolled severe range Bps, lab abnormalities, or neuro symptoms become apparent, restart magnesium for seizure prophylaxis. Post delivery, recommend 24hrs  of Mag.  NICU consult     2. Routine prenatal care  -Blood type/Ab screen   - GBS neg       3. GDM  - Continue metformin and insulin outpatient regimen: Regimen: Metformin 1000mg BID; Levemir 10u QAM/14u QHS   - Order SSI now as she is getting steroids  - Follow routine management for postpartum- should be able to stop all meds      Thank you for allowing us to participate in the care of your patient. If you have any questions/concerns, please do not hesitate to contact us.     Pati Baker, MSN, APRN, WHNP-BC  Maternal Fetal Medicine

## 2024-02-02 NOTE — PLAN OF CARE
Problem: Adult Inpatient Plan of Care  Goal: Plan of Care Review  Outcome: Ongoing, Progressing  Goal: Patient-Specific Goal (Individualized)  Outcome: Ongoing, Progressing  Goal: Absence of Hospital-Acquired Illness or Injury  Outcome: Ongoing, Progressing  Goal: Optimal Comfort and Wellbeing  Outcome: Ongoing, Progressing  Goal: Readiness for Transition of Care  Outcome: Ongoing, Progressing     Problem: Diabetes Comorbidity  Goal: Blood Glucose Level Within Targeted Range  Outcome: Ongoing, Progressing     Problem:  Fall Injury Risk  Goal: Absence of Fall, Infant Drop and Related Injury  Outcome: Ongoing, Progressing     Problem: Hypertensive Disorders in Pregnancy  Goal: Maternal-Fetal Stabilization  Outcome: Ongoing, Progressing

## 2024-02-03 LAB
BASOPHILS # BLD AUTO: 0.03 X10(3)/MCL
BASOPHILS NFR BLD AUTO: 0.2 %
EOSINOPHIL # BLD AUTO: 0.01 X10(3)/MCL (ref 0–0.9)
EOSINOPHIL NFR BLD AUTO: 0.1 %
ERYTHROCYTE [DISTWIDTH] IN BLOOD BY AUTOMATED COUNT: 16.3 % (ref 11.5–17)
HCT VFR BLD AUTO: 29.5 % (ref 37–47)
HGB BLD-MCNC: 10 G/DL (ref 12–16)
IMM GRANULOCYTES # BLD AUTO: 0.21 X10(3)/MCL (ref 0–0.04)
IMM GRANULOCYTES NFR BLD AUTO: 1.4 %
LYMPHOCYTES # BLD AUTO: 2.15 X10(3)/MCL (ref 0.6–4.6)
LYMPHOCYTES NFR BLD AUTO: 14.4 %
MCH RBC QN AUTO: 29.5 PG (ref 27–31)
MCHC RBC AUTO-ENTMCNC: 33.9 G/DL (ref 33–36)
MCV RBC AUTO: 87 FL (ref 80–94)
MONOCYTES # BLD AUTO: 1.34 X10(3)/MCL (ref 0.1–1.3)
MONOCYTES NFR BLD AUTO: 9 %
NEUTROPHILS # BLD AUTO: 11.21 X10(3)/MCL (ref 2.1–9.2)
NEUTROPHILS NFR BLD AUTO: 74.9 %
NRBC BLD AUTO-RTO: 0.3 %
PLATELET # BLD AUTO: 203 X10(3)/MCL (ref 130–400)
PMV BLD AUTO: 10 FL (ref 7.4–10.4)
RBC # BLD AUTO: 3.39 X10(6)/MCL (ref 4.2–5.4)
WBC # SPEC AUTO: 14.95 X10(3)/MCL (ref 4.5–11.5)

## 2024-02-03 PROCEDURE — 63600175 PHARM REV CODE 636 W HCPCS: Performed by: OBSTETRICS & GYNECOLOGY

## 2024-02-03 PROCEDURE — 25000003 PHARM REV CODE 250: Performed by: NURSE PRACTITIONER

## 2024-02-03 PROCEDURE — 25000003 PHARM REV CODE 250: Performed by: OBSTETRICS & GYNECOLOGY

## 2024-02-03 PROCEDURE — 63600175 PHARM REV CODE 636 W HCPCS: Mod: JZ,JG | Performed by: OBSTETRICS & GYNECOLOGY

## 2024-02-03 PROCEDURE — 85025 COMPLETE CBC W/AUTO DIFF WBC: CPT | Performed by: OBSTETRICS & GYNECOLOGY

## 2024-02-03 PROCEDURE — 11000001 HC ACUTE MED/SURG PRIVATE ROOM

## 2024-02-03 RX ORDER — DOCUSATE SODIUM 100 MG/1
200 CAPSULE, LIQUID FILLED ORAL 2 TIMES DAILY
Status: DISCONTINUED | OUTPATIENT
Start: 2024-02-03 | End: 2024-02-06 | Stop reason: HOSPADM

## 2024-02-03 RX ORDER — OXYCODONE AND ACETAMINOPHEN 5; 325 MG/1; MG/1
1 TABLET ORAL EVERY 4 HOURS PRN
Status: DISCONTINUED | OUTPATIENT
Start: 2024-02-03 | End: 2024-02-06 | Stop reason: HOSPADM

## 2024-02-03 RX ORDER — NIFEDIPINE 10 MG/1
10 CAPSULE ORAL ONCE
Status: COMPLETED | OUTPATIENT
Start: 2024-02-03 | End: 2024-02-03

## 2024-02-03 RX ORDER — SIMETHICONE 80 MG
1 TABLET,CHEWABLE ORAL EVERY 6 HOURS PRN
Status: DISCONTINUED | OUTPATIENT
Start: 2024-02-04 | End: 2024-02-06 | Stop reason: HOSPADM

## 2024-02-03 RX ORDER — KETOROLAC TROMETHAMINE 30 MG/ML
30 INJECTION, SOLUTION INTRAMUSCULAR; INTRAVENOUS EVERY 6 HOURS
Status: DISCONTINUED | OUTPATIENT
Start: 2024-02-03 | End: 2024-02-03

## 2024-02-03 RX ORDER — NIFEDIPINE 30 MG/1
30 TABLET, EXTENDED RELEASE ORAL DAILY
Status: DISCONTINUED | OUTPATIENT
Start: 2024-02-03 | End: 2024-02-04

## 2024-02-03 RX ORDER — METFORMIN HYDROCHLORIDE 500 MG/1
1000 TABLET ORAL 2 TIMES DAILY WITH MEALS
Status: DISCONTINUED | OUTPATIENT
Start: 2024-02-03 | End: 2024-02-04

## 2024-02-03 RX ORDER — OXYCODONE AND ACETAMINOPHEN 10; 325 MG/1; MG/1
1 TABLET ORAL EVERY 4 HOURS PRN
Status: DISCONTINUED | OUTPATIENT
Start: 2024-02-04 | End: 2024-02-03

## 2024-02-03 RX ORDER — AMOXICILLIN 250 MG
1 CAPSULE ORAL NIGHTLY PRN
Status: DISCONTINUED | OUTPATIENT
Start: 2024-02-04 | End: 2024-02-06 | Stop reason: HOSPADM

## 2024-02-03 RX ORDER — IBUPROFEN 600 MG/1
600 TABLET ORAL EVERY 6 HOURS PRN
Status: DISCONTINUED | OUTPATIENT
Start: 2024-02-03 | End: 2024-02-06 | Stop reason: HOSPADM

## 2024-02-03 RX ORDER — BISACODYL 10 MG/1
10 SUPPOSITORY RECTAL ONCE AS NEEDED
Status: DISCONTINUED | OUTPATIENT
Start: 2024-02-04 | End: 2024-02-06 | Stop reason: HOSPADM

## 2024-02-03 RX ORDER — OXYCODONE AND ACETAMINOPHEN 10; 325 MG/1; MG/1
1 TABLET ORAL EVERY 4 HOURS PRN
Status: DISCONTINUED | OUTPATIENT
Start: 2024-02-03 | End: 2024-02-06 | Stop reason: HOSPADM

## 2024-02-03 RX ORDER — OXYTOCIN/RINGER'S LACTATE 30/500 ML
95 PLASTIC BAG, INJECTION (ML) INTRAVENOUS ONCE
Status: DISCONTINUED | OUTPATIENT
Start: 2024-02-04 | End: 2024-02-06 | Stop reason: HOSPADM

## 2024-02-03 RX ORDER — DIPHENHYDRAMINE HCL 25 MG
25 CAPSULE ORAL EVERY 4 HOURS PRN
Status: DISCONTINUED | OUTPATIENT
Start: 2024-02-04 | End: 2024-02-06 | Stop reason: HOSPADM

## 2024-02-03 RX ORDER — PRENATAL WITH FERROUS FUM AND FOLIC ACID 3080; 920; 120; 400; 22; 1.84; 3; 20; 10; 1; 12; 200; 27; 25; 2 [IU]/1; [IU]/1; MG/1; [IU]/1; MG/1; MG/1; MG/1; MG/1; MG/1; MG/1; UG/1; MG/1; MG/1; MG/1; MG/1
1 TABLET ORAL DAILY
Status: DISCONTINUED | OUTPATIENT
Start: 2024-02-03 | End: 2024-02-06 | Stop reason: HOSPADM

## 2024-02-03 RX ORDER — ADHESIVE BANDAGE
30 BANDAGE TOPICAL 2 TIMES DAILY PRN
Status: DISCONTINUED | OUTPATIENT
Start: 2024-02-04 | End: 2024-02-06 | Stop reason: HOSPADM

## 2024-02-03 RX ADMIN — DOCUSATE SODIUM 200 MG: 100 CAPSULE, LIQUID FILLED ORAL at 09:02

## 2024-02-03 RX ADMIN — KETOROLAC TROMETHAMINE 30 MG: 30 INJECTION, SOLUTION INTRAMUSCULAR at 09:02

## 2024-02-03 RX ADMIN — OXYCODONE AND ACETAMINOPHEN 1 TABLET: 10; 325 TABLET ORAL at 08:02

## 2024-02-03 RX ADMIN — OXYCODONE HYDROCHLORIDE AND ACETAMINOPHEN 1 TABLET: 5; 325 TABLET ORAL at 02:02

## 2024-02-03 RX ADMIN — PRENATAL VITAMINS-IRON FUMARATE 27 MG IRON-FOLIC ACID 0.8 MG TABLET 1 TABLET: at 08:02

## 2024-02-03 RX ADMIN — LABETALOL HYDROCHLORIDE 300 MG: 100 TABLET, FILM COATED ORAL at 01:02

## 2024-02-03 RX ADMIN — SODIUM CHLORIDE, POTASSIUM CHLORIDE, SODIUM LACTATE AND CALCIUM CHLORIDE: 600; 310; 30; 20 INJECTION, SOLUTION INTRAVENOUS at 07:02

## 2024-02-03 RX ADMIN — KETOROLAC TROMETHAMINE 30 MG: 30 INJECTION, SOLUTION INTRAMUSCULAR at 02:02

## 2024-02-03 RX ADMIN — NIFEDIPINE 30 MG: 30 TABLET, FILM COATED, EXTENDED RELEASE ORAL at 08:02

## 2024-02-03 RX ADMIN — LABETALOL HYDROCHLORIDE 300 MG: 100 TABLET, FILM COATED ORAL at 08:02

## 2024-02-03 RX ADMIN — METFORMIN HYDROCHLORIDE 1000 MG: 500 TABLET, FILM COATED ORAL at 09:02

## 2024-02-03 RX ADMIN — METFORMIN HYDROCHLORIDE 1000 MG: 500 TABLET, FILM COATED ORAL at 04:02

## 2024-02-03 RX ADMIN — LABETALOL HYDROCHLORIDE 300 MG: 100 TABLET, FILM COATED ORAL at 09:02

## 2024-02-03 RX ADMIN — DOCUSATE SODIUM 200 MG: 100 CAPSULE, LIQUID FILLED ORAL at 08:02

## 2024-02-03 RX ADMIN — MAGNESIUM SULFATE HEPTAHYDRATE 2 G/HR: 40 INJECTION, SOLUTION INTRAVENOUS at 07:02

## 2024-02-03 RX ADMIN — MORPHINE SULFATE 4 MG: 4 INJECTION, SOLUTION INTRAMUSCULAR; INTRAVENOUS at 12:02

## 2024-02-03 RX ADMIN — OXYCODONE HYDROCHLORIDE AND ACETAMINOPHEN 1 TABLET: 5; 325 TABLET ORAL at 09:02

## 2024-02-03 RX ADMIN — NIFEDIPINE 10 MG: 10 CAPSULE ORAL at 07:02

## 2024-02-03 NOTE — OP NOTE
OPERATIVE REPORT    Date of Procedure: 2024     Patient Name: Rosemarie Good    MRN: [id number]    Surgeon: Ernie Chicas MD    Assistant: MERNA    Pre-Operative Diagnosis:  Elective primary  section   Severe preeclampsia, third trimester [O14.13]    Post-Operative Diagnosis: Post-Op Diagnosis Codes:     * Severe preeclampsia, third trimester [O14.13]    Anesthesia: Spinal/Epidural    Procedure:   Procedure(s) (LRB):   SECTION (N/A)       Complications: No    Estimated Blood Loss (EBL): 800           Findings: Live Male    Specimens: Cord Blood         Disposition: PACU - hemodynamically stable.    Procedure in Detail:    Patient elected for primary  section she was thoroughly consented verbally as well as signed consent and once NPO status was adequate was taken to the operating room for low transverse  section.    Patient was taken to the operating room she was prepped and draped in a normal sterile fashion dorsal supine position in leftward tilt. The patient was consented prior to the procedure, and antibiotics were given preoperatively. Once anesthesia was found be adequate a Pfannenstiel incision was made on the abdomen carried down to the underlying fascia. The fascia was incised midline and extended laterally and the fascia was dissected off the rectus muscle cephalad and caudad. The rectus muscle was  midline and the peritoneum was entered bluntly. A lower blade was inserted and a bladder flap was created. Low transverse incision was made on the uterus the uterus was entered bluntly and the hysterotomy was extended laterally. The infant's head was delivered without difficulty along with the shoulders and the rest of the infant. The mouth and nose were suctioned on the abdomen. Clear fluid was noted and x2 nuchal cords. Cord was doubly clamped and cut. Infant was handed off to the waiting staff. Cord blood was taken and the placenta was extracted manually. The  uterus was exteriorized cleared of all clots and debris. Hysterotomy was repaired with 0 Vicryl and 2nd imbricating layer was done with an 0 Vicryl. The uterus was returned to the abdomen gutters were cleared of all clots and debris and the hysterotomy was reinspected for hemostasis. Peritoneum was reapproximated with a 2-0 Vicryl rectus muscles reapproximated with 2-0 Vicryl. Fascia was reapproximated with an 0 Vicryl subcutaneous fat was reapproximated with 2-0 plain and skin was reapproximated with staples. Patient tolerated the procedure well and instrument lap and needle count were correct ×3 patient was taken to recovery room in a stable condition estimated blood loss was 800.

## 2024-02-03 NOTE — TRANSFER OF CARE
"Anesthesia Transfer of Care Note    Patient: Rosemarie Good    Procedure(s) Performed: Procedure(s) (LRB):   SECTION (N/A)    Patient location: Labor and Delivery    Anesthesia Type: spinal    Transport from OR: Transported from OR on room air with adequate spontaneous ventilation    Post pain: adequate analgesia    Post assessment: no apparent anesthetic complications    Post vital signs: stable    Level of consciousness: awake, alert and oriented    Nausea/Vomiting: no nausea/vomiting    Complications: none    Transfer of care protocol was followed      Last vitals: Visit Vitals  BP (!) 163/99   Pulse 87   Temp 36.8 °C (98.2 °F)   Resp 18   Ht 5' 4" (1.626 m)   Wt 98.9 kg (218 lb)   LMP  (LMP Unknown)   SpO2 97%   Breastfeeding No   BMI 37.42 kg/m²     "

## 2024-02-03 NOTE — PROGRESS NOTES
Delivery Progress Note  Obstetrics      SUBJECTIVE:     Postpartum Day 1:  Delivery    Ms. Good states she feels well. Her pain is well controlled with current medications. She is pumping for the baby who is currently admitted to NICU. . The patient is not ambulating 2/2 magnesium sulfate administration. Ms. Good is tolerating a normal diet. Flatus has not been passed. Urinary output is adequate per alonso.     OBJECTIVE:     Vital Signs (Most Recent):  Temp: 98.9 °F (37.2 °C) (24 07)  Pulse: 71 (24)  Resp: 18 (24)  BP: (!) 155/95 (24)  SpO2: 96 % (24)    Vital Signs Range (Last 24H):  Temp:  [97.6 °F (36.4 °C)-99 °F (37.2 °C)]   Pulse:  []   Resp:  [16-22]   BP: (117-185)/()   SpO2:  [90 %-99 %]     I & O (Last 24H):  Intake/Output Summary (Last 24 hours) at 2/3/2024 0855  Last data filed at 2/3/2024 0640  Gross per 24 hour   Intake 4971.25 ml   Output 3753 ml   Net 1218.25 ml     Physical Exam:  General:    No distress, alert and oriented       Abdomen:  Soft, normal bowel sounds, appropriately tender   Fundus:  Firm, below umbilicus   Incision:  Bandage C/D/I   Lochia:   Rubra, minimal   Lower ext:  No calf tenderness; 1+ edema bilateral LE, reflexes 3+; neg clonus     Hemoglobin/Hematocrit  Recent Labs   Lab 24  0441   HGB 10.0*   HCT 29.5*         ASSESSMENT/PLAN:     Status post  section POD#1 - overall doing well  Continue routine postpartum care    Severe preeclampsia  -Mild range blood pressures with rare severe range  -Continue Magnesium sulfate x 24 hours (dc 2100)  -Continue Labetolol 300mg BID  -Give Procardia 10mg and start Procardia 30mg XL daily    GDM  -FBS 82  -Resume Metformin 1000mg BID    Zeinab Belle NP

## 2024-02-03 NOTE — ANESTHESIA POSTPROCEDURE EVALUATION
Anesthesia Post Evaluation    Patient: Rosemarie Good    Procedure(s) Performed: Procedure(s) (LRB):   SECTION (N/A)    Final Anesthesia Type: spinal      Patient location during evaluation: labor & delivery  Patient participation: Yes- Able to Participate  Level of consciousness: awake and alert  Post-procedure vital signs: reviewed and stable  Pain management: adequate  Airway patency: patent  JAS mitigation strategies: Multimodal analgesia  PONV status at discharge: No PONV  Anesthetic complications: no      Cardiovascular status: blood pressure returned to baseline and hemodynamically stable  Respiratory status: unassisted  Hydration status: euvolemic  Follow-up not needed.            Vitals Value Taken Time   BP  24 2156   Temp  24 2156   Pulse  24 2156   Resp  24 2156   SpO2  24 215         Event Time   Out of Recovery 2024 23:39:00         Pain/Loraine Score: No data recorded

## 2024-02-03 NOTE — ANESTHESIA PREPROCEDURE EVALUATION
02/02/2024  Rosemarie Good is a 33 y.o., female.      Pre-op Assessment    I have reviewed the Patient Summary Reports.     I have reviewed the Nursing Notes. I have reviewed the NPO Status.   I have reviewed the Medications.     Review of Systems  Anesthesia Hx:  No problems with previous Anesthesia                Hematology/Oncology:  Hematology Normal   Oncology Normal                                   EENT/Dental:  EENT/Dental Normal           Cardiovascular:     Hypertension                                        Pulmonary:  Pulmonary Normal                       Renal/:  Renal/ Normal                 Hepatic/GI:     GERD             OB/GYN/PEDS:  PIH           Neurological:    Neuromuscular Disease,  Headaches                                 Endocrine:  Diabetes   PCOS        Psych:  Psychiatric Normal                  Physical Exam  General: Well nourished, Cooperative, Alert and Oriented    Airway:  Mallampati: II   Mouth Opening: Normal  TM Distance: Normal  Tongue: Normal  Neck ROM: Normal ROM    Dental:  Intact    Chest/Lungs:  Clear to auscultation    Heart:  Rate: Normal    Anesthesia Plan  Type of Anesthesia, risks & benefits discussed:    Anesthesia Type: Spinal  Intra-op Monitoring Plan: Standard ASA Monitors  Post Op Pain Control Plan: multimodal analgesia  Induction:  IV  Airway Plan: Direct  Informed Consent: Informed consent signed with the Patient and all parties understand the risks and agree with anesthesia plan.  All questions answered.   ASA Score: 3  Day of Surgery Review of History & Physical: H&P Update referred to the surgeon/provider.I have interviewed and examined the patient. I have reviewed the patient's H&P dated:     Ready For Surgery From Anesthesia Perspective.   .

## 2024-02-03 NOTE — ANESTHESIA PREPROCEDURE EVALUATION
[facility] - Labor and Delivery  Anesthesiology   Section    Patient Name: Rosemarie Good  MRN: [id number]  Admission Date: 2024  Primary Care Provider: Elodia, Primary Doctor  Attending Physician: Paola Hale MD    Subjective:      section with Dr. Chicas. Severe PreE.    OB History    Para Term  AB Living   1 0 0 0 0 0   SAB IAB Ectopic Multiple Live Births   0 0 0 0 0      # Outcome Date GA Lbr Abdias/2nd Weight Sex Delivery Anes PTL Lv   1 Current              Past Medical History:   Diagnosis Date    Acid reflux     Allergy     Dyspareunia     Fibromyalgia     Frequent headaches     Gestational diabetes mellitus (GDM) affecting first pregnancy     Insulin resistance     Migraines     PCOS (polycystic ovarian syndrome)     Sinus problem     TMJ (dislocation of temporomandibular joint)      Past Surgical History:   Procedure Laterality Date    BIOPSY OF ADENOIDS      CHOLECYSTECTOMY      TONSILLECTOMY      UPPER GI SCOPE      WISDOM TOOTH EXTRACTION Bilateral        PTA Medications   Medication Sig    ACCRUFER 30 mg Cap Take 1 capsule by mouth 2 (two) times daily.    aspirin (ECOTRIN) 81 MG EC tablet Take 1 tablet (81 mg total) by mouth once daily. (Patient taking differently: Take 81 mg by mouth nightly.)    DULoxetine (CYMBALTA) 20 MG capsule Take 20 mg by mouth once daily.    insulin detemir U-100, Levemir, (LEVEMIR FLEXPEN) 100 unit/mL (3 mL) InPn pen Inject 10u with breakfast and 14u at bedtime with diabetic snack (Patient taking differently: Inject into the skin. Inject 10u with breakfast and 14u at bedtime with diabetic snack)    metFORMIN (GLUCOPHAGE) 1000 MG tablet Take 1 tablet (1,000 mg total) by mouth 2 (two) times a day.    montelukast (SINGULAIR) 10 mg tablet Take 10 mg by mouth once daily.    omeprazole (PRILOSEC) 40 MG capsule Take 40 mg by mouth 2 (two) times a day.    ondansetron (ZOFRAN) 8 MG tablet Take 8 mg by mouth every 8 (eight)  "hours as needed for Nausea.    prenatal 25/iron fum/folic/dha (PRENATAL-1 ORAL) Take 1 tablet by mouth once daily.    blood sugar diagnostic Strp To check BG 4 times daily, to use with insurance preferred meter    fluticasone propionate (FLONASE) 50 mcg/actuation nasal spray 2 sprays by Each Nostril route daily as needed.    lancets Misc To check BG 4 times daily, to use with insurance preferred meter    pen needle, diabetic (PEN NEEDLE) 31 gauge x 5/16" Ndle 1 application  by Misc.(Non-Drug; Combo Route) route nightly. Use pen needle to inject insulin every night at bedtime    TRUE METRIX GLUCOSE METER Misc        Review of patient's allergies indicates:  No Known Allergies     Tobacco Use    Smoking status: Never    Smokeless tobacco: Never   Substance and Sexual Activity    Alcohol use: Not Currently    Drug use: Never    Sexual activity: Yes     Partners: Male     Birth control/protection: None     Review of Systems   Neurological:  Positive for headaches.      Objective:     Vital Signs (Most Recent):  Temp: 36.8 °C (98.2 °F) (24 1620)  Pulse: 85 (24 1700)  Resp: 18 (24 1804)  BP: (!) 142/92 (24 1700)  SpO2: 98 % (24 1700) Vital Signs (24h Range):  Temp:  [36.7 °C (98 °F)-37.2 °C (99 °F)] 36.8 °C (98.2 °F)  Pulse:  [67-97] 85  Resp:  [16-20] 18  SpO2:  [95 %-99 %] 98 %  BP: (117-185)/() 142/92     Weight: 98.9 kg (218 lb)  Body mass index is 37.42 kg/m².    Significant Labs:  Lab Results   Component Value Date    WBC 13.98 (H) 2024    HGB 11.9 (L) 2024    HCT 37.7 2024    MCV 92.0 2024     2024       Assessment/Plan:     33 y.o. female  at 33w6d for:  section    Jake Cain, CRYSTAL  Anesthesiology  [facility] - Labor and Delivery       Pre-op Assessment    I have reviewed the Patient Summary Reports.     I have reviewed the Nursing Notes. I have reviewed the NPO Status.   I have reviewed the Medications. "     Review of Systems  Hematology/Oncology:  Hematology Normal   Oncology Normal                                   EENT/Dental:  EENT/Dental Normal           Cardiovascular:     Hypertension                                        Pulmonary:  Pulmonary Normal                       Renal/:  Renal/ Normal                 Hepatic/GI:     GERD             Musculoskeletal:  Musculoskeletal Normal                Neurological:    Neuromuscular Disease,  Headaches                                 Endocrine:  Diabetes           Dermatological:  Skin Normal    Psych:  Psychiatric Normal                    Physical Exam  General: Well nourished, Cooperative, Oriented and Alert    Airway:  Mallampati: II   Mouth Opening: Normal  TM Distance: Normal  Tongue: Normal  Neck ROM: Normal ROM        Anesthesia Plan  Type of Anesthesia, risks & benefits discussed:    Anesthesia Type: Epidural  Informed Consent: Informed consent signed with the Patient and all parties understand the risks and agree with anesthesia plan.  All questions answered.   ASA Score: 2  Day of Surgery Review of History & Physical: H&P Update referred to the surgeon/provider.    Ready For Surgery From Anesthesia Perspective.     .

## 2024-02-03 NOTE — ANESTHESIA PROCEDURE NOTES
Spinal    Diagnosis: Repeat  Section  Patient location during procedure: OB  Start time: 2024 8:46 PM  Timeout: 2024 8:43 PM  End time: 2024 8:51 PM    Staffing  Authorizing Provider: Tad Garcia MD  Performing Provider: Jake Cain CRNA    Staffing  Performed by: Jake aCin CRNA  Authorized by: Tad Garcia MD    Preanesthetic Checklist  Completed: patient identified, IV checked, site marked, risks and benefits discussed, surgical consent, monitors and equipment checked, pre-op evaluation and timeout performed  Spinal Block  Patient position: sitting  Prep: ChloraPrep  Patient monitoring: heart rate, continuous pulse ox and frequent blood pressure checks  Approach: midline  Location: L2-3  Injection technique: single shot  CSF Fluid: clear free-flowing CSF  Needle  Needle type: pencil-tip   Needle gauge: 25 G  Needle length: 3.5 in  Additional Documentation: negative aspiration for heme, no paresthesia on injection and incremental injection  Needle localization: anatomical landmarks  Assessment  Sensory level: T6   Dermatomal levels determined by pinch or prick  Ease of block: easy  Patient's tolerance of the procedure: comfortable throughout block and no complaints  Medications:    Medications: bupivacaine (pf) (MARCAINE) injection 0.75% - Intraspinal   1.7 mL - 2024 8:51:00 PM

## 2024-02-04 PROCEDURE — 25000003 PHARM REV CODE 250: Performed by: OBSTETRICS & GYNECOLOGY

## 2024-02-04 PROCEDURE — 25000003 PHARM REV CODE 250: Performed by: NURSE PRACTITIONER

## 2024-02-04 PROCEDURE — 11000001 HC ACUTE MED/SURG PRIVATE ROOM

## 2024-02-04 RX ORDER — NIFEDIPINE 30 MG/1
30 TABLET, EXTENDED RELEASE ORAL ONCE
Status: COMPLETED | OUTPATIENT
Start: 2024-02-04 | End: 2024-02-04

## 2024-02-04 RX ORDER — NIFEDIPINE 10 MG/1
10 CAPSULE ORAL ONCE
Status: COMPLETED | OUTPATIENT
Start: 2024-02-04 | End: 2024-02-04

## 2024-02-04 RX ORDER — NIFEDIPINE 60 MG/1
60 TABLET, EXTENDED RELEASE ORAL DAILY
Status: DISCONTINUED | OUTPATIENT
Start: 2024-02-05 | End: 2024-02-05

## 2024-02-04 RX ADMIN — METFORMIN HYDROCHLORIDE 1000 MG: 500 TABLET, FILM COATED ORAL at 08:02

## 2024-02-04 RX ADMIN — NIFEDIPINE 10 MG: 10 CAPSULE ORAL at 09:02

## 2024-02-04 RX ADMIN — ACETAMINOPHEN 1000 MG: 500 TABLET ORAL at 08:02

## 2024-02-04 RX ADMIN — IBUPROFEN 600 MG: 600 TABLET, FILM COATED ORAL at 12:02

## 2024-02-04 RX ADMIN — DOCUSATE SODIUM 200 MG: 100 CAPSULE, LIQUID FILLED ORAL at 08:02

## 2024-02-04 RX ADMIN — LABETALOL HYDROCHLORIDE 300 MG: 100 TABLET, FILM COATED ORAL at 08:02

## 2024-02-04 RX ADMIN — NIFEDIPINE 30 MG: 30 TABLET, FILM COATED, EXTENDED RELEASE ORAL at 08:02

## 2024-02-04 RX ADMIN — LABETALOL HYDROCHLORIDE 300 MG: 100 TABLET, FILM COATED ORAL at 12:02

## 2024-02-04 RX ADMIN — LABETALOL HYDROCHLORIDE 300 MG: 100 TABLET, FILM COATED ORAL at 04:02

## 2024-02-04 RX ADMIN — PRENATAL VITAMINS-IRON FUMARATE 27 MG IRON-FOLIC ACID 0.8 MG TABLET 1 TABLET: at 08:02

## 2024-02-04 RX ADMIN — IBUPROFEN 600 MG: 600 TABLET, FILM COATED ORAL at 06:02

## 2024-02-04 RX ADMIN — IBUPROFEN 600 MG: 600 TABLET, FILM COATED ORAL at 03:02

## 2024-02-04 RX ADMIN — NIFEDIPINE 30 MG: 30 TABLET, FILM COATED, EXTENDED RELEASE ORAL at 09:02

## 2024-02-04 RX ADMIN — IBUPROFEN 600 MG: 600 TABLET, FILM COATED ORAL at 11:02

## 2024-02-04 RX ADMIN — SIMETHICONE 80 MG: 80 TABLET, CHEWABLE ORAL at 03:02

## 2024-02-04 NOTE — PROGRESS NOTES
Delivery Progress Note  Obstetrics      SUBJECTIVE:     Postpartum Day 2:  Delivery, elective; severe preeclampsia    Ms. Good states she feels well. Her pain is well controlled with current medications. She is pumping for the baby who is currently admitted to NICU. . The patient is ambulating well.  Ms. Good is tolerating a normal diet. Flatus has not been passed. Urinary output is adequate. She denies specifically CP/SOB, headache, visual changes, RUQ/epigastric pain.     OBJECTIVE:     Vital Signs (Most Recent):  Temp: 97.9 °F (36.6 °C) (24 0800)  Pulse: 87 (24)  Resp: 18 (24)  BP: (!) 174/118 (24)  SpO2: 98 % (24)    Vital Signs Range (Last 24H):  Temp:  [97.9 °F (36.6 °C)-99.3 °F (37.4 °C)]   Pulse:  [66-94]   Resp:  [16-18]   BP: (118-174)/()   SpO2:  [91 %-98 %]     I & O (Last 24H):  Intake/Output Summary (Last 24 hours) at 2024 0952  Last data filed at 2/3/2024 2110  Gross per 24 hour   Intake 4255.83 ml   Output 2375 ml   Net 1880.83 ml     Physical Exam:  General:    No distress, alert and oriented       Abdomen:  Soft, normal bowel sounds, appropriately tender   Fundus:  Firm, below umbilicus   Incision:  Bandage C/D/I   Lochia:   Rubra, minimal   Lower ext:  No calf tenderness; 1+ edema bilateral LE, reflexes 2+; neg clonus     Hemoglobin/Hematocrit  Recent Labs   Lab 24  0441   HGB 10.0*   HCT 29.5*         ASSESSMENT/PLAN:     Status post  section POD#2 - overall doing well  Continue routine postpartum care    Severe preeclampsia  -Mild-severe range blood pressures noted this morning  -s/p Magnesium sulfate (d/c 2/3/24 at 2100)  -Continue Labetolol 300mg TID  -start Procardia 60mg XL daily  -Labetolol protocol prn    GDM  -discontinue Metformin  -recommend follow up at 6wk postpartum    Zeinab Lorenzor, ANALISA     activity

## 2024-02-04 NOTE — PLAN OF CARE
Problem: Postoperative Urinary Retention (Postpartum  Delivery)  Goal: Effective Urinary Elimination  Outcome: Ongoing, Progressing     Problem: Postoperative Nausea and Vomiting (Postpartum  Delivery)  Goal: Nausea and Vomiting Relief  Outcome: Ongoing, Progressing     Problem: Pain (Postpartum  Delivery)  Goal: Acceptable Pain Control  Outcome: Ongoing, Progressing     Problem: Infection (Postpartum  Delivery)  Goal: Absence of Infection Signs and Symptoms  Outcome: Ongoing, Progressing     Problem: Bleeding (Postpartum  Delivery)  Goal: Hemostasis  Outcome: Ongoing, Progressing     Problem: Adjustment to Role Transition (Postpartum  Delivery)  Goal: Successful Maternal Role Transition  Outcome: Ongoing, Progressing     Problem: Infection  Goal: Absence of Infection Signs and Symptoms  Outcome: Ongoing, Progressing     Problem: Hypertensive Disorders in Pregnancy  Goal: Maternal-Fetal Stabilization  Outcome: Ongoing, Progressing     Problem:  Fall Injury Risk  Goal: Absence of Fall, Infant Drop and Related Injury  Outcome: Ongoing, Progressing     Problem: Diabetes Comorbidity  Goal: Blood Glucose Level Within Targeted Range  Outcome: Ongoing, Progressing     Problem: Adult Inpatient Plan of Care  Goal: Plan of Care Review  Outcome: Ongoing, Progressing  Goal: Patient-Specific Goal (Individualized)  Outcome: Ongoing, Progressing  Goal: Absence of Hospital-Acquired Illness or Injury  Outcome: Ongoing, Progressing  Goal: Optimal Comfort and Wellbeing  Outcome: Ongoing, Progressing  Goal: Readiness for Transition of Care  Outcome: Ongoing, Progressing

## 2024-02-04 NOTE — NURSING
Notified NP Tonia, regarding patients blood pressure of 166/116. Was instructed to check BP again in 15 minutes. After the 15 minute period, BP was rechecked and noted to be 169/110. After speaking to Tonia, was instructed to hold Labetalol protocol and give patients 10 mg of Procardia and continue to check BP q15 for 1-2 hours or until BP reaches within normal limits. Will notify NP if BP does not improve.

## 2024-02-05 PROCEDURE — 11000001 HC ACUTE MED/SURG PRIVATE ROOM

## 2024-02-05 PROCEDURE — 25000003 PHARM REV CODE 250: Performed by: NURSE PRACTITIONER

## 2024-02-05 PROCEDURE — 63600175 PHARM REV CODE 636 W HCPCS: Performed by: OBSTETRICS & GYNECOLOGY

## 2024-02-05 PROCEDURE — 25000003 PHARM REV CODE 250: Performed by: OBSTETRICS & GYNECOLOGY

## 2024-02-05 RX ORDER — IBUPROFEN 600 MG/1
600 TABLET ORAL EVERY 6 HOURS PRN
Qty: 30 TABLET | Refills: 0 | Status: SHIPPED | OUTPATIENT
Start: 2024-02-05

## 2024-02-05 RX ORDER — DOCUSATE SODIUM 100 MG/1
200 CAPSULE, LIQUID FILLED ORAL 2 TIMES DAILY
Refills: 0
Start: 2024-02-05

## 2024-02-05 RX ORDER — LABETALOL HYDROCHLORIDE 5 MG/ML
20 INJECTION, SOLUTION INTRAVENOUS ONCE
Status: COMPLETED | OUTPATIENT
Start: 2024-02-05 | End: 2024-02-05

## 2024-02-05 RX ORDER — LABETALOL 300 MG/1
300 TABLET, FILM COATED ORAL EVERY 8 HOURS
Qty: 90 TABLET | Refills: 11 | Status: SHIPPED | OUTPATIENT
Start: 2024-02-05 | End: 2025-02-04

## 2024-02-05 RX ORDER — ACETAMINOPHEN 500 MG
1000 TABLET ORAL EVERY 6 HOURS PRN
Refills: 0
Start: 2024-02-05

## 2024-02-05 RX ORDER — NIFEDIPINE 60 MG/1
60 TABLET, EXTENDED RELEASE ORAL 2 TIMES DAILY
Status: DISCONTINUED | OUTPATIENT
Start: 2024-02-05 | End: 2024-02-06 | Stop reason: HOSPADM

## 2024-02-05 RX ORDER — NIFEDIPINE 60 MG/1
60 TABLET, EXTENDED RELEASE ORAL DAILY
Qty: 30 TABLET | Refills: 11 | Status: SHIPPED | OUTPATIENT
Start: 2024-02-05 | End: 2025-02-04

## 2024-02-05 RX ADMIN — NIFEDIPINE 60 MG: 60 TABLET, FILM COATED, EXTENDED RELEASE ORAL at 08:02

## 2024-02-05 RX ADMIN — IBUPROFEN 600 MG: 600 TABLET, FILM COATED ORAL at 06:02

## 2024-02-05 RX ADMIN — DOCUSATE SODIUM 200 MG: 100 CAPSULE, LIQUID FILLED ORAL at 07:02

## 2024-02-05 RX ADMIN — NIFEDIPINE 60 MG: 60 TABLET, FILM COATED, EXTENDED RELEASE ORAL at 04:02

## 2024-02-05 RX ADMIN — LABETALOL HYDROCHLORIDE 20 MG: 5 INJECTION INTRAVENOUS at 06:02

## 2024-02-05 RX ADMIN — LABETALOL HYDROCHLORIDE 300 MG: 100 TABLET, FILM COATED ORAL at 09:02

## 2024-02-05 RX ADMIN — DOCUSATE SODIUM 200 MG: 100 CAPSULE, LIQUID FILLED ORAL at 09:02

## 2024-02-05 RX ADMIN — PRENATAL VITAMINS-IRON FUMARATE 27 MG IRON-FOLIC ACID 0.8 MG TABLET 1 TABLET: at 07:02

## 2024-02-05 RX ADMIN — LABETALOL HYDROCHLORIDE 300 MG: 100 TABLET, FILM COATED ORAL at 06:02

## 2024-02-05 RX ADMIN — LABETALOL HYDROCHLORIDE 20 MG: 5 INJECTION, SOLUTION INTRAVENOUS at 08:02

## 2024-02-05 RX ADMIN — IBUPROFEN 600 MG: 600 TABLET, FILM COATED ORAL at 11:02

## 2024-02-05 RX ADMIN — LABETALOL HYDROCHLORIDE 300 MG: 100 TABLET, FILM COATED ORAL at 01:02

## 2024-02-05 NOTE — PLAN OF CARE
Problem: Adult Inpatient Plan of Care  Goal: Plan of Care Review  Outcome: Ongoing, Progressing  Goal: Patient-Specific Goal (Individualized)  Outcome: Ongoing, Progressing  Goal: Absence of Hospital-Acquired Illness or Injury  Outcome: Ongoing, Progressing  Goal: Optimal Comfort and Wellbeing  Outcome: Ongoing, Progressing  Goal: Readiness for Transition of Care  Outcome: Ongoing, Progressing     Problem: Diabetes Comorbidity  Goal: Blood Glucose Level Within Targeted Range  Outcome: Ongoing, Progressing     Problem:  Fall Injury Risk  Goal: Absence of Fall, Infant Drop and Related Injury  Outcome: Ongoing, Progressing     Problem: Hypertensive Disorders in Pregnancy  Goal: Maternal-Fetal Stabilization  Outcome: Ongoing, Progressing     Problem: Infection  Goal: Absence of Infection Signs and Symptoms  Outcome: Ongoing, Progressing     Problem: Adjustment to Role Transition (Postpartum  Delivery)  Goal: Successful Maternal Role Transition  Outcome: Ongoing, Progressing     Problem: Bleeding (Postpartum  Delivery)  Goal: Hemostasis  Outcome: Ongoing, Progressing     Problem: Infection (Postpartum  Delivery)  Goal: Absence of Infection Signs and Symptoms  Outcome: Ongoing, Progressing     Problem: Pain (Postpartum  Delivery)  Goal: Acceptable Pain Control  Outcome: Ongoing, Progressing     Problem: Postoperative Nausea and Vomiting (Postpartum  Delivery)  Goal: Nausea and Vomiting Relief  Outcome: Ongoing, Progressing     Problem: Postoperative Urinary Retention (Postpartum  Delivery)  Goal: Effective Urinary Elimination  Outcome: Ongoing, Progressing

## 2024-02-05 NOTE — NURSING
Administered procardia at 0830 in nicu as scheduled for patient's BP, will recheck in 1 hr upon arrival from NICU
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
## 2024-02-06 VITALS
HEIGHT: 64 IN | HEART RATE: 82 BPM | TEMPERATURE: 99 F | RESPIRATION RATE: 18 BRPM | BODY MASS INDEX: 37.22 KG/M2 | WEIGHT: 218 LBS | DIASTOLIC BLOOD PRESSURE: 89 MMHG | OXYGEN SATURATION: 100 % | SYSTOLIC BLOOD PRESSURE: 137 MMHG

## 2024-02-06 LAB — PSYCHE PATHOLOGY RESULT: NORMAL

## 2024-02-06 PROCEDURE — 25000003 PHARM REV CODE 250: Performed by: NURSE PRACTITIONER

## 2024-02-06 PROCEDURE — 25000003 PHARM REV CODE 250: Performed by: OBSTETRICS & GYNECOLOGY

## 2024-02-06 RX ADMIN — LABETALOL HYDROCHLORIDE 300 MG: 100 TABLET, FILM COATED ORAL at 06:02

## 2024-02-06 RX ADMIN — NIFEDIPINE 60 MG: 60 TABLET, FILM COATED, EXTENDED RELEASE ORAL at 08:02

## 2024-02-06 RX ADMIN — IBUPROFEN 600 MG: 600 TABLET, FILM COATED ORAL at 06:02

## 2024-02-06 RX ADMIN — PRENATAL VITAMINS-IRON FUMARATE 27 MG IRON-FOLIC ACID 0.8 MG TABLET 1 TABLET: at 08:02

## 2024-02-06 RX ADMIN — DOCUSATE SODIUM 200 MG: 100 CAPSULE, LIQUID FILLED ORAL at 08:02
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
## 2024-02-08 ENCOUNTER — PATIENT MESSAGE (OUTPATIENT)
Dept: ADMINISTRATIVE | Facility: OTHER | Age: 34
End: 2024-02-08
Payer: COMMERCIAL

## 2024-02-09 ENCOUNTER — PATIENT MESSAGE (OUTPATIENT)
Dept: ADMINISTRATIVE | Facility: OTHER | Age: 34
End: 2024-02-09
Payer: COMMERCIAL

## 2024-02-09 LAB
POCT GLUCOSE: 77 MG/DL (ref 70–110)
POCT GLUCOSE: 82 MG/DL (ref 70–110)

## 2024-02-23 ENCOUNTER — PATIENT MESSAGE (OUTPATIENT)
Dept: MATERNAL FETAL MEDICINE | Facility: CLINIC | Age: 34
End: 2024-02-23
Payer: COMMERCIAL

## 2024-07-17 NOTE — DISCHARGE INSTRUCTIONS

## (undated) DEVICE — SEE MEDLINE ITEM 157117

## (undated) DEVICE — CUP TENDERTOUCH SIL 60MM

## (undated) DEVICE — SOL NACL IRR 1000ML BTL

## (undated) DEVICE — SUT 3/0 36IN COATED VICRYL

## (undated) DEVICE — SOL WATER STRL IRR 1000ML

## (undated) DEVICE — SUT 0 36IN PDS II VIO MONO

## (undated) DEVICE — BINDER ABDOM 4PANEL 12IN LG/XL

## (undated) DEVICE — PAD SANITARY OB STERILE

## (undated) DEVICE — Device

## (undated) DEVICE — ELECTRODE REM PLYHSV RETURN 9

## (undated) DEVICE — SEE MEDLINE ITEM 156931

## (undated) DEVICE — PAD UNDERPAD 30X30

## (undated) DEVICE — CAP BABY BEANIE

## (undated) DEVICE — SUT CTD VICRYL 0 UND BR CT

## (undated) DEVICE — SUT 2-0 VICRYL / CT-1

## (undated) DEVICE — BULB SYRINGE EAR IRRIGATION

## (undated) DEVICE — SUT CHROMIC GUT 2-0 CT-1 27IN